# Patient Record
Sex: FEMALE | Race: WHITE | Employment: UNEMPLOYED | URBAN - METROPOLITAN AREA
[De-identification: names, ages, dates, MRNs, and addresses within clinical notes are randomized per-mention and may not be internally consistent; named-entity substitution may affect disease eponyms.]

---

## 2017-10-01 ENCOUNTER — APPOINTMENT (OUTPATIENT)
Dept: GENERAL RADIOLOGY | Age: 62
DRG: 193 | End: 2017-10-01
Attending: EMERGENCY MEDICINE
Payer: MEDICARE

## 2017-10-01 ENCOUNTER — HOSPITAL ENCOUNTER (INPATIENT)
Age: 62
LOS: 3 days | Discharge: HOME HEALTH | DRG: 193 | End: 2017-10-04
Attending: EMERGENCY MEDICINE | Admitting: HOSPITALIST
Payer: MEDICARE

## 2017-10-01 DIAGNOSIS — J44.1 ACUTE EXACERBATION OF CHRONIC OBSTRUCTIVE PULMONARY DISEASE (COPD) (HCC): ICD-10-CM

## 2017-10-01 DIAGNOSIS — I48.91 ATRIAL FIBRILLATION WITH RVR (HCC): Primary | ICD-10-CM

## 2017-10-01 PROBLEM — J90 PLEURAL EFFUSION: Status: ACTIVE | Noted: 2017-10-01

## 2017-10-01 LAB
ALBUMIN SERPL-MCNC: 3.3 G/DL (ref 3.4–5)
ALBUMIN/GLOB SERPL: 0.8 {RATIO} (ref 0.8–1.7)
ALP SERPL-CCNC: 170 U/L (ref 45–117)
ALT SERPL-CCNC: 14 U/L (ref 13–56)
ANION GAP SERPL CALC-SCNC: 6 MMOL/L (ref 3–18)
APPEARANCE UR: CLEAR
AST SERPL-CCNC: 14 U/L (ref 15–37)
BACTERIA URNS QL MICRO: ABNORMAL /HPF
BASOPHILS # BLD: 0 K/UL (ref 0–0.06)
BASOPHILS NFR BLD: 0 % (ref 0–2)
BILIRUB SERPL-MCNC: 1.1 MG/DL (ref 0.2–1)
BILIRUB UR QL: NEGATIVE
BNP SERPL-MCNC: 365 PG/ML (ref 0–900)
BUN SERPL-MCNC: 22 MG/DL (ref 7–18)
BUN/CREAT SERPL: 23 (ref 12–20)
CALCIUM SERPL-MCNC: 9 MG/DL (ref 8.5–10.1)
CHLORIDE SERPL-SCNC: 100 MMOL/L (ref 100–108)
CK MB CFR SERPL CALC: NORMAL % (ref 0–4)
CK MB SERPL-MCNC: <1 NG/ML (ref 5–25)
CK SERPL-CCNC: 31 U/L (ref 26–192)
CO2 SERPL-SCNC: 30 MMOL/L (ref 21–32)
COLOR UR: YELLOW
CREAT SERPL-MCNC: 0.94 MG/DL (ref 0.6–1.3)
DIFFERENTIAL METHOD BLD: ABNORMAL
EOSINOPHIL # BLD: 0.2 K/UL (ref 0–0.4)
EOSINOPHIL NFR BLD: 1 % (ref 0–5)
EPITH CASTS URNS QL MICRO: ABNORMAL /LPF (ref 0–5)
ERYTHROCYTE [DISTWIDTH] IN BLOOD BY AUTOMATED COUNT: 15 % (ref 11.6–14.5)
GLOBULIN SER CALC-MCNC: 3.9 G/DL (ref 2–4)
GLUCOSE SERPL-MCNC: 135 MG/DL (ref 74–99)
GLUCOSE UR STRIP.AUTO-MCNC: NEGATIVE MG/DL
HCT VFR BLD AUTO: 43.6 % (ref 35–45)
HGB BLD-MCNC: 13.6 G/DL (ref 12–16)
HGB UR QL STRIP: NEGATIVE
KETONES UR QL STRIP.AUTO: NEGATIVE MG/DL
LACTATE BLD-SCNC: 1.3 MMOL/L (ref 0.4–2)
LEUKOCYTE ESTERASE UR QL STRIP.AUTO: ABNORMAL
LYMPHOCYTES # BLD: 1.7 K/UL (ref 0.9–3.6)
LYMPHOCYTES NFR BLD: 15 % (ref 21–52)
MCH RBC QN AUTO: 30.3 PG (ref 24–34)
MCHC RBC AUTO-ENTMCNC: 31.2 G/DL (ref 31–37)
MCV RBC AUTO: 97.1 FL (ref 74–97)
MONOCYTES # BLD: 1.1 K/UL (ref 0.05–1.2)
MONOCYTES NFR BLD: 9 % (ref 3–10)
NEUTS SEG # BLD: 8.8 K/UL (ref 1.8–8)
NEUTS SEG NFR BLD: 75 % (ref 40–73)
NITRITE UR QL STRIP.AUTO: NEGATIVE
PH UR STRIP: 5.5 [PH] (ref 5–8)
PLATELET # BLD AUTO: 231 K/UL (ref 135–420)
PMV BLD AUTO: 10.1 FL (ref 9.2–11.8)
POTASSIUM SERPL-SCNC: 4.1 MMOL/L (ref 3.5–5.5)
PROT SERPL-MCNC: 7.2 G/DL (ref 6.4–8.2)
PROT UR STRIP-MCNC: NEGATIVE MG/DL
RBC # BLD AUTO: 4.49 M/UL (ref 4.2–5.3)
RBC #/AREA URNS HPF: ABNORMAL /HPF (ref 0–5)
SODIUM SERPL-SCNC: 136 MMOL/L (ref 136–145)
SP GR UR REFRACTOMETRY: 1.01 (ref 1–1.03)
T4 FREE SERPL-MCNC: 1.3 NG/DL (ref 0.7–1.5)
TROPONIN I SERPL-MCNC: <0.02 NG/ML (ref 0–0.04)
TSH SERPL DL<=0.05 MIU/L-ACNC: 0.62 UIU/ML (ref 0.36–3.74)
UROBILINOGEN UR QL STRIP.AUTO: 0.2 EU/DL (ref 0.2–1)
WBC # BLD AUTO: 11.7 K/UL (ref 4.6–13.2)
WBC URNS QL MICRO: ABNORMAL /HPF (ref 0–4)

## 2017-10-01 PROCEDURE — 74011000258 HC RX REV CODE- 258: Performed by: EMERGENCY MEDICINE

## 2017-10-01 PROCEDURE — 87077 CULTURE AEROBIC IDENTIFY: CPT | Performed by: EMERGENCY MEDICINE

## 2017-10-01 PROCEDURE — 99285 EMERGENCY DEPT VISIT HI MDM: CPT

## 2017-10-01 PROCEDURE — 74011000250 HC RX REV CODE- 250: Performed by: EMERGENCY MEDICINE

## 2017-10-01 PROCEDURE — 74011000258 HC RX REV CODE- 258: Performed by: HOSPITALIST

## 2017-10-01 PROCEDURE — 93005 ELECTROCARDIOGRAM TRACING: CPT

## 2017-10-01 PROCEDURE — 94640 AIRWAY INHALATION TREATMENT: CPT

## 2017-10-01 PROCEDURE — 74011250636 HC RX REV CODE- 250/636: Performed by: EMERGENCY MEDICINE

## 2017-10-01 PROCEDURE — 83880 ASSAY OF NATRIURETIC PEPTIDE: CPT | Performed by: EMERGENCY MEDICINE

## 2017-10-01 PROCEDURE — 83605 ASSAY OF LACTIC ACID: CPT

## 2017-10-01 PROCEDURE — 65660000001 HC RM ICU INTERMED STEPDOWN

## 2017-10-01 PROCEDURE — 81001 URINALYSIS AUTO W/SCOPE: CPT | Performed by: EMERGENCY MEDICINE

## 2017-10-01 PROCEDURE — 77030011256 HC DRSG MEPILEX <16IN NO BORD MOLN -A

## 2017-10-01 PROCEDURE — 51702 INSERT TEMP BLADDER CATH: CPT

## 2017-10-01 PROCEDURE — 87086 URINE CULTURE/COLONY COUNT: CPT | Performed by: EMERGENCY MEDICINE

## 2017-10-01 PROCEDURE — 74011000250 HC RX REV CODE- 250

## 2017-10-01 PROCEDURE — 74011250636 HC RX REV CODE- 250/636: Performed by: HOSPITALIST

## 2017-10-01 PROCEDURE — 80053 COMPREHEN METABOLIC PANEL: CPT | Performed by: EMERGENCY MEDICINE

## 2017-10-01 PROCEDURE — 74011250637 HC RX REV CODE- 250/637: Performed by: HOSPITALIST

## 2017-10-01 PROCEDURE — 77030034849

## 2017-10-01 PROCEDURE — 84439 ASSAY OF FREE THYROXINE: CPT | Performed by: HOSPITALIST

## 2017-10-01 PROCEDURE — 82550 ASSAY OF CK (CPK): CPT | Performed by: EMERGENCY MEDICINE

## 2017-10-01 PROCEDURE — 87186 SC STD MICRODIL/AGAR DIL: CPT | Performed by: EMERGENCY MEDICINE

## 2017-10-01 PROCEDURE — 87040 BLOOD CULTURE FOR BACTERIA: CPT | Performed by: EMERGENCY MEDICINE

## 2017-10-01 PROCEDURE — 96375 TX/PRO/DX INJ NEW DRUG ADDON: CPT

## 2017-10-01 PROCEDURE — 74011000250 HC RX REV CODE- 250: Performed by: HOSPITALIST

## 2017-10-01 PROCEDURE — 94760 N-INVAS EAR/PLS OXIMETRY 1: CPT

## 2017-10-01 PROCEDURE — 74011250637 HC RX REV CODE- 250/637: Performed by: EMERGENCY MEDICINE

## 2017-10-01 PROCEDURE — 71010 XR CHEST PORT: CPT

## 2017-10-01 PROCEDURE — 96365 THER/PROPH/DIAG IV INF INIT: CPT

## 2017-10-01 PROCEDURE — 84443 ASSAY THYROID STIM HORMONE: CPT | Performed by: EMERGENCY MEDICINE

## 2017-10-01 PROCEDURE — 85025 COMPLETE CBC W/AUTO DIFF WBC: CPT | Performed by: EMERGENCY MEDICINE

## 2017-10-01 PROCEDURE — 36415 COLL VENOUS BLD VENIPUNCTURE: CPT | Performed by: HOSPITALIST

## 2017-10-01 RX ORDER — AMITRIPTYLINE HYDROCHLORIDE 50 MG/1
50 TABLET, FILM COATED ORAL
Status: DISCONTINUED | OUTPATIENT
Start: 2017-10-01 | End: 2017-10-04 | Stop reason: HOSPADM

## 2017-10-01 RX ORDER — DILTIAZEM HYDROCHLORIDE 5 MG/ML
10 INJECTION INTRAVENOUS ONCE
Status: COMPLETED | OUTPATIENT
Start: 2017-10-01 | End: 2017-10-01

## 2017-10-01 RX ORDER — DIGOXIN 0.25 MG/ML
500 INJECTION INTRAMUSCULAR; INTRAVENOUS
Status: COMPLETED | OUTPATIENT
Start: 2017-10-01 | End: 2017-10-01

## 2017-10-01 RX ORDER — GUAIFENESIN 100 MG/5ML
324 LIQUID (ML) ORAL
Status: COMPLETED | OUTPATIENT
Start: 2017-10-01 | End: 2017-10-01

## 2017-10-01 RX ORDER — DILTIAZEM HYDROCHLORIDE 5 MG/ML
15 INJECTION INTRAVENOUS ONCE
Status: COMPLETED | OUTPATIENT
Start: 2017-10-01 | End: 2017-10-01

## 2017-10-01 RX ORDER — ALBUTEROL SULFATE 0.83 MG/ML
2.5 SOLUTION RESPIRATORY (INHALATION)
Status: DISCONTINUED | OUTPATIENT
Start: 2017-10-01 | End: 2017-10-01

## 2017-10-01 RX ORDER — BUMETANIDE 0.25 MG/ML
1 INJECTION INTRAMUSCULAR; INTRAVENOUS 2 TIMES DAILY
Status: CANCELLED | OUTPATIENT
Start: 2017-10-01

## 2017-10-01 RX ORDER — DICLOFENAC POTASSIUM 50 MG/1
50 TABLET, FILM COATED ORAL 2 TIMES DAILY
COMMUNITY
End: 2018-01-27

## 2017-10-01 RX ORDER — ONDANSETRON 2 MG/ML
4 INJECTION INTRAMUSCULAR; INTRAVENOUS
Status: DISCONTINUED | OUTPATIENT
Start: 2017-10-01 | End: 2017-10-04 | Stop reason: HOSPADM

## 2017-10-01 RX ORDER — IPRATROPIUM BROMIDE AND ALBUTEROL SULFATE 2.5; .5 MG/3ML; MG/3ML
3 SOLUTION RESPIRATORY (INHALATION)
Status: DISCONTINUED | OUTPATIENT
Start: 2017-10-01 | End: 2017-10-04 | Stop reason: HOSPADM

## 2017-10-01 RX ORDER — GLUCOSAMINE SULFATE 1500 MG
POWDER IN PACKET (EA) ORAL DAILY
COMMUNITY
End: 2018-01-27

## 2017-10-01 RX ORDER — HEPARIN SODIUM 5000 [USP'U]/ML
5000 INJECTION, SOLUTION INTRAVENOUS; SUBCUTANEOUS EVERY 8 HOURS
Status: DISCONTINUED | OUTPATIENT
Start: 2017-10-01 | End: 2017-10-04 | Stop reason: HOSPADM

## 2017-10-01 RX ORDER — HYDROCODONE BITARTRATE AND ACETAMINOPHEN 7.5; 325 MG/1; MG/1
1 TABLET ORAL EVERY 4 HOURS
Status: DISCONTINUED | OUTPATIENT
Start: 2017-10-01 | End: 2017-10-04 | Stop reason: HOSPADM

## 2017-10-01 RX ORDER — LEVOFLOXACIN 5 MG/ML
750 INJECTION, SOLUTION INTRAVENOUS EVERY 24 HOURS
Status: DISCONTINUED | OUTPATIENT
Start: 2017-10-02 | End: 2017-10-04

## 2017-10-01 RX ORDER — FUROSEMIDE 20 MG/1
TABLET ORAL DAILY
COMMUNITY
End: 2017-10-04

## 2017-10-01 RX ORDER — FUROSEMIDE 40 MG/1
40 TABLET ORAL DAILY
Status: DISCONTINUED | OUTPATIENT
Start: 2017-10-02 | End: 2017-10-04 | Stop reason: HOSPADM

## 2017-10-01 RX ORDER — ACETAMINOPHEN 325 MG/1
650 TABLET ORAL
Status: DISCONTINUED | OUTPATIENT
Start: 2017-10-01 | End: 2017-10-04 | Stop reason: HOSPADM

## 2017-10-01 RX ORDER — FUROSEMIDE 10 MG/ML
40 INJECTION INTRAMUSCULAR; INTRAVENOUS
Status: COMPLETED | OUTPATIENT
Start: 2017-10-01 | End: 2017-10-01

## 2017-10-01 RX ORDER — DILTIAZEM HYDROCHLORIDE 5 MG/ML
INJECTION INTRAVENOUS
Status: COMPLETED
Start: 2017-10-01 | End: 2017-10-01

## 2017-10-01 RX ORDER — ALBUTEROL SULFATE 0.83 MG/ML
2.5 SOLUTION RESPIRATORY (INHALATION)
Status: DISCONTINUED | OUTPATIENT
Start: 2017-10-01 | End: 2017-10-04 | Stop reason: HOSPADM

## 2017-10-01 RX ORDER — ALBUTEROL SULFATE 0.83 MG/ML
SOLUTION RESPIRATORY (INHALATION) ONCE
COMMUNITY
End: 2017-10-20 | Stop reason: SDUPTHER

## 2017-10-01 RX ORDER — HYDROCODONE BITARTRATE AND ACETAMINOPHEN 5; 325 MG/1; MG/1
1 TABLET ORAL
Status: COMPLETED | OUTPATIENT
Start: 2017-10-01 | End: 2017-10-01

## 2017-10-01 RX ORDER — BUMETANIDE 0.25 MG/ML
1 INJECTION INTRAMUSCULAR; INTRAVENOUS ONCE
Status: COMPLETED | OUTPATIENT
Start: 2017-10-01 | End: 2017-10-01

## 2017-10-01 RX ORDER — HYDROCODONE BITARTRATE AND ACETAMINOPHEN 7.5; 325 MG/1; MG/1
1 TABLET ORAL EVERY 4 HOURS
COMMUNITY

## 2017-10-01 RX ORDER — LEVOFLOXACIN 5 MG/ML
500 INJECTION, SOLUTION INTRAVENOUS
Status: COMPLETED | OUTPATIENT
Start: 2017-10-01 | End: 2017-10-01

## 2017-10-01 RX ORDER — AMITRIPTYLINE HYDROCHLORIDE 25 MG/1
50 TABLET, FILM COATED ORAL
COMMUNITY
End: 2018-02-12

## 2017-10-01 RX ADMIN — DIGOXIN 500 MCG: 250 INJECTION, SOLUTION INTRAMUSCULAR; INTRAVENOUS; PARENTERAL at 11:16

## 2017-10-01 RX ADMIN — AMITRIPTYLINE HYDROCHLORIDE 50 MG: 50 TABLET, FILM COATED ORAL at 22:27

## 2017-10-01 RX ADMIN — DILTIAZEM HYDROCHLORIDE 15 MG: 5 INJECTION INTRAVENOUS at 10:36

## 2017-10-01 RX ADMIN — LEVOFLOXACIN 500 MG: 500 INJECTION, SOLUTION INTRAVENOUS at 13:43

## 2017-10-01 RX ADMIN — HEPARIN SODIUM 5000 UNITS: 5000 INJECTION, SOLUTION INTRAVENOUS; SUBCUTANEOUS at 16:43

## 2017-10-01 RX ADMIN — NITROGLYCERIN 0.5 INCH: 20 OINTMENT TOPICAL at 11:03

## 2017-10-01 RX ADMIN — FUROSEMIDE 40 MG: 10 INJECTION, SOLUTION INTRAMUSCULAR; INTRAVENOUS at 11:02

## 2017-10-01 RX ADMIN — HYDROCODONE BITARTRATE AND ACETAMINOPHEN 1 TABLET: 7.5; 325 TABLET ORAL at 16:43

## 2017-10-01 RX ADMIN — ASPIRIN 81 MG 324 MG: 81 TABLET ORAL at 11:02

## 2017-10-01 RX ADMIN — IPRATROPIUM BROMIDE AND ALBUTEROL SULFATE 3 ML: .5; 3 SOLUTION RESPIRATORY (INHALATION) at 19:45

## 2017-10-01 RX ADMIN — BUMETANIDE 1 MG: 0.25 INJECTION, SOLUTION INTRAMUSCULAR; INTRAVENOUS at 13:44

## 2017-10-01 RX ADMIN — IPRATROPIUM BROMIDE AND ALBUTEROL SULFATE 3 ML: .5; 3 SOLUTION RESPIRATORY (INHALATION) at 16:15

## 2017-10-01 RX ADMIN — DILTIAZEM HYDROCHLORIDE 10 MG: 5 INJECTION INTRAVENOUS at 11:02

## 2017-10-01 RX ADMIN — SODIUM CHLORIDE 10 MG/HR: 900 INJECTION, SOLUTION INTRAVENOUS at 11:04

## 2017-10-01 RX ADMIN — HYDROCODONE BITARTRATE AND ACETAMINOPHEN 1 TABLET: 7.5; 325 TABLET ORAL at 20:44

## 2017-10-01 RX ADMIN — HYDROCODONE BITARTRATE AND ACETAMINOPHEN 1 TABLET: 5; 325 TABLET ORAL at 12:25

## 2017-10-01 NOTE — IP AVS SNAPSHOT
Candice Lebron 
 
 
 [address] [place] [address] 
[phone] Patient: Luna Yang MRN: [id number] Current Discharge Medication List  
  
START taking these medications Dose & Instructions Dispensing Information Comments Morning Noon Evening Bedtime  
 albuterol-ipratropium 2.5 mg-0.5 mg/3 ml Nebu Commonly known as:  Everrett Margaret Dose:  3 mL  
3 mL by Nebulization route every four (4) hours as needed. Quantity:  30 Nebule Refills:  0  
     
   
   
   
  
 aspirin 325 mg tablet Commonly known as:  ASPIRIN Dose:  325 mg Take 1 Tab by mouth daily. Quantity:  100 Tab Refills:  0  
     
  
   
   
   
  
 dexamethasone 2 mg tablet Commonly known as:  DECADRON Take 4 mg po tid x 3 days,then 4 mg po bid x 3 days,then 4 mg po daily x 3 days Quantity:  18 Tab Refills:  0  
     
  
   
   
   
  
 digoxin 0.25 mg tablet Commonly known as:  LANOXIN Dose:  0.25 mg Take 1 Tab by mouth daily. Quantity:  30 Tab Refills:  0  
     
  
   
   
   
  
 levoFLOXacin 750 mg tablet Commonly known as:  Arlyss Palmer Dose:  750 mg Take 1 Tab by mouth daily. Quantity:  7 Tab Refills:  0  
     
  
   
   
   
  
 metoprolol tartrate 25 mg tablet Commonly known as:  LOPRESSOR Dose:  12.5 mg Take 0.5 Tabs by mouth every twelve (12) hours. Quantity:  60 Tab Refills:  0 CONTINUE these medications which have CHANGED Dose & Instructions Dispensing Information Comments Morning Noon Evening Bedtime  
 furosemide 40 mg tablet Commonly known as:  LASIX What changed:   
- medication strength 
- how to take this - when to take this 
- additional instructions 40 MG PO DAILY Quantity:  30 Tab Refills:  0 CONTINUE these medications which have NOT CHANGED Dose & Instructions Dispensing Information Comments Morning Noon Evening Bedtime  
 albuterol 2.5 mg /3 mL (0.083 %) nebulizer solution Commonly known as:  PROVENTIL VENTOLIN  
   
 by Nebulization route once. Refills:  0  
     
   
   
   
  
 amitriptyline 25 mg tablet Commonly known as:  ELAVIL Dose:  50 mg Take 50 mg by mouth nightly. Refills:  0  
     
   
   
   
  
 diclofenac potassium 50 mg tablet Commonly known as:  CATAFLAM  
   
 Dose:  50 mg Take 50 mg by mouth two (2) times a day. Indications: RHEUMATOID ARTHRITIS Refills:  0 NORCO 7.5-325 mg per tablet Generic drug:  HYDROcodone-acetaminophen Dose:  1 Tab Take 1 Tab by mouth every four (4) hours. Refills:  0  
     
   
   
   
  
 VITAMIN D3 1,000 unit Cap Generic drug:  cholecalciferol Take  by mouth daily. Refills:  0 Where to Get Your Medications Information on where to get these meds will be given to you by the nurse or doctor. ! Ask your nurse or doctor about these medications  
  albuterol-ipratropium 2.5 mg-0.5 mg/3 ml Nebu  
 aspirin 325 mg tablet  
 dexamethasone 2 mg tablet  
 digoxin 0.25 mg tablet  
 furosemide 40 mg tablet  
 levoFLOXacin 750 mg tablet  
 metoprolol tartrate 25 mg tablet

## 2017-10-01 NOTE — IP AVS SNAPSHOT
Lennie Mckeon 
 
 
 [address] [place] [address] 
[phone] Patient: Melquiades Palmer MRN: [id number] TSN:0/7/9464 You are allergic to the following Allergen Reactions Peanut Anaphylaxis Sting, Bee Anaphylaxis Biaxin (Clarithromycin) Angioedema Penicillins Angioedema Prednisone Angioedema Recent Documentation Height Weight BMI Smoking Status 1.626 m 136.8 kg 51.75 kg/m2 Passive Smoke Exposure - Never Smoker Unresulted Labs Order Current Status CULTURE, BLOOD Preliminary result CULTURE, BLOOD Preliminary result Emergency Contacts Name Discharge Info Relation Home Work Mobile Nato Monroy DISCHARGE CAREGIVER [3] Friend [5]   [phone] About your hospitalization You were admitted on:  October 1, 2017 You last received care in the:  [address] [place], [place] [address] You were discharged on:  October 4, 2017 Unit phone number:  [phone] Why you were hospitalized Your primary diagnosis was:  Atrial Fibrillation With Rvr (Hcc) Your diagnoses also included:  Copd Exacerbation (Hcc), Atrial Fibrillation (Hcc) Providers Seen During Your Hospitalizations Provider Role Specialty Primary office phone Bairon Vanegas MD Attending Provider Emergency Medicine [phone] Katelin Chang MD Attending Provider Internal Medicine [phone] Marbella Calloway MD Attending Provider Internal Medicine [phone] Your Primary Care Physician (PCP) Primary Care Physician Office Phone Office Fax Nicole Sun [phone] Follow-up Information Follow up With Details Comments Contact Info Mauri Fletcher MD On 10/9/2017 1:30pm [address] [facility] [address] 
[phone] Brigido Fish MD Call in 4 weeks Schedule appt [address] [facility] [address] [facility] [address] [phone] 
[phone] Current Discharge Medication List  
  
START taking these medications Dose & Instructions Dispensing Information Comments Morning Noon Evening Bedtime  
 albuterol-ipratropium 2.5 mg-0.5 mg/3 ml Nebu Commonly known as:  Dinesh Franklin Dose:  3 mL  
3 mL by Nebulization route every four (4) hours as needed. Quantity:  30 Nebule Refills:  0  
     
   
   
   
  
 aspirin 325 mg tablet Commonly known as:  ASPIRIN Dose:  325 mg Take 1 Tab by mouth daily. Quantity:  100 Tab Refills:  0  
     
  
   
   
   
  
 dexamethasone 2 mg tablet Commonly known as:  DECADRON Take 4 mg po tid x 3 days,then 4 mg po bid x 3 days,then 4 mg po daily x 3 days Quantity:  18 Tab Refills:  0  
     
  
   
   
   
  
 digoxin 0.25 mg tablet Commonly known as:  LANOXIN Dose:  0.25 mg Take 1 Tab by mouth daily. Quantity:  30 Tab Refills:  0  
     
  
   
   
   
  
 levoFLOXacin 750 mg tablet Commonly known as:  Rue Renny Dose:  750 mg Take 1 Tab by mouth daily. Quantity:  7 Tab Refills:  0  
     
  
   
   
   
  
 metoprolol tartrate 25 mg tablet Commonly known as:  LOPRESSOR Dose:  12.5 mg Take 0.5 Tabs by mouth every twelve (12) hours. Quantity:  60 Tab Refills:  0 CONTINUE these medications which have CHANGED Dose & Instructions Dispensing Information Comments Morning Noon Evening Bedtime  
 furosemide 40 mg tablet Commonly known as:  LASIX What changed:   
- medication strength 
- how to take this - when to take this 
- additional instructions 40 MG PO DAILY Quantity:  30 Tab Refills:  0 CONTINUE these medications which have NOT CHANGED Dose & Instructions Dispensing Information Comments Morning Noon Evening Bedtime  
 albuterol 2.5 mg /3 mL (0.083 %) nebulizer solution Commonly known as:  PROVENTIL VENTOLIN  
   
 by Nebulization route once. Refills:  0  
     
   
   
   
  
 amitriptyline 25 mg tablet Commonly known as:  ELAVIL Dose:  50 mg Take 50 mg by mouth nightly. Refills:  0  
     
   
   
   
  
 diclofenac potassium 50 mg tablet Commonly known as:  CATAFLAM  
   
 Dose:  50 mg Take 50 mg by mouth two (2) times a day. Indications: RHEUMATOID ARTHRITIS Refills:  0 NORCO 7.5-325 mg per tablet Generic drug:  HYDROcodone-acetaminophen Dose:  1 Tab Take 1 Tab by mouth every four (4) hours. Refills:  0  
     
   
   
   
  
 VITAMIN D3 1,000 unit Cap Generic drug:  cholecalciferol Take  by mouth daily. Refills:  0 Where to Get Your Medications Information on where to get these meds will be given to you by the nurse or doctor. ! Ask your nurse or doctor about these medications  
  albuterol-ipratropium 2.5 mg-0.5 mg/3 ml Nebu  
 aspirin 325 mg tablet  
 dexamethasone 2 mg tablet  
 digoxin 0.25 mg tablet  
 furosemide 40 mg tablet  
 levoFLOXacin 750 mg tablet  
 metoprolol tartrate 25 mg tablet Discharge Instructions Atrial Fibrillation: Care Instructions Your Care Instructions Atrial fibrillation is an irregular and often fast heartbeat. Treating this condition is important for several reasons. It can cause blood clots, which can travel from your heart to your brain and cause a stroke. If you have a fast heartbeat, you may feel lightheaded, dizzy, and weak. An irregular heartbeat can also increase your risk for heart failure. Atrial fibrillation is often the result of another heart condition, such as high blood pressure or coronary artery disease. Making changes to improve your heart condition will help you stay healthy and active. Follow-up care is a key part of your treatment and safety.  Be sure to make and go to all appointments, and call your doctor if you are having problems. It's also a good idea to know your test results and keep a list of the medicines you take. How can you care for yourself at home? Medicines · Take your medicines exactly as prescribed. Call your doctor if you think you are having a problem with your medicine. You will get more details on the specific medicines your doctor prescribes. · If your doctor has given you a blood thinner to prevent a stroke, be sure you get instructions about how to take your medicine safely. Blood thinners can cause serious bleeding problems. · Do not take any vitamins, over-the-counter drugs, or herbal products without talking to your doctor first. 
Lifestyle changes · Do not smoke. Smoking can increase your chance of a stroke and heart attack. If you need help quitting, talk to your doctor about stop-smoking programs and medicines. These can increase your chances of quitting for good. · Eat a heart-healthy diet. · Stay at a healthy weight. Lose weight if you need to. · Limit alcohol to 2 drinks a day for men and 1 drink a day for women. Too much alcohol can cause health problems. · Avoid colds and flu. Get a pneumococcal vaccine shot. If you have had one before, ask your doctor whether you need another dose. Get a flu shot every year. If you must be around people with colds or flu, wash your hands often. Activity · If your doctor recommends it, get more exercise. Walking is a good choice. Bit by bit, increase the amount you walk every day. Try for at least 30 minutes on most days of the week. You also may want to swim, bike, or do other activities. Your doctor may suggest that you join a cardiac rehabilitation program so that you can have help increasing your physical activity safely. · Start light exercise if your doctor says it is okay. Even a small amount will help you get stronger, have more energy, and manage stress.  Walking is an easy way to get exercise. Start out by walking a little more than you did in the hospital. Gradually increase the amount you walk. · When you exercise, watch for signs that your heart is working too hard. You are pushing too hard if you cannot talk while you are exercising. If you become short of breath or dizzy or have chest pain, sit down and rest immediately. · Check your pulse regularly. Place two fingers on the artery at the palm side of your wrist, in line with your thumb. If your heartbeat seems uneven or fast, talk to your doctor. When should you call for help? Call 911 anytime you think you may need emergency care. For example, call if: 
· You have symptoms of a heart attack. These may include: ¨ Chest pain or pressure, or a strange feeling in the chest. 
¨ Sweating. ¨ Shortness of breath. ¨ Nausea or vomiting. ¨ Pain, pressure, or a strange feeling in the back, neck, jaw, or upper belly or in one or both shoulders or arms. ¨ Lightheadedness or sudden weakness. ¨ A fast or irregular heartbeat. After you call 911, the  may tell you to chew 1 adult-strength or 2 to 4 low-dose aspirin. Wait for an ambulance. Do not try to drive yourself. · You have symptoms of a stroke. These may include: 
¨ Sudden numbness, tingling, weakness, or loss of movement in your face, arm, or leg, especially on only one side of your body. ¨ Sudden vision changes. ¨ Sudden trouble speaking. ¨ Sudden confusion or trouble understanding simple statements. ¨ Sudden problems with walking or balance. ¨ A sudden, severe headache that is different from past headaches. · You passed out (lost consciousness). Call your doctor now or seek immediate medical care if: 
· You have new or increased shortness of breath. · You feel dizzy or lightheaded, or you feel like you may faint. · Your heart rate becomes irregular. · You can feel your heart flutter in your chest or skip heartbeats.  Tell your doctor if these symptoms are new or worse. Watch closely for changes in your health, and be sure to contact your doctor if you have any problems. Where can you learn more? Go to http://driss-nabila.info/. Enter U020 in the search box to learn more about \"Atrial Fibrillation: Care Instructions. \" Current as of: September 21, 2016 Content Version: 11.3 © 0977-2675 [employer]. Care instructions adapted under license by [employer] (which disclaims liability or warranty for this information). If you have questions about a medical condition or this instruction, always ask your healthcare professional. [place] [address] any warranty or liability for your use of this information. Deciding Between Electrical Cardioversion and Rate Control Medicines for Atrial Fibrillation What is atrial fibrillation? Atrial fibrillation (say \"AY-tree-sumi kiy-bekf-SDG-shun\") is a kind of uneven heartbeat. It can make you feel lightheaded and dizzy. You may feel weak. It also can make you more likely to have a stroke. Electrical cardioversion can return your heart to a normal rhythm. First you'll get medicines to make you sleepy and control pain. Then your doctor will use patches to send an electric current to your heart. This resets the rhythm of your heart. Not everyone with atrial fibrillation needs this treatment. For some people, taking medicines may be better. Most people can live with an uneven heartbeat. It just has to be kept under control so the heart does not beat too fast. 
Use this information to help you and your doctor decide which treatment to choose for atrial fibrillation. What are alvarenga points about this decision? · Electrical cardioversion can return your heart to a normal rhythm. But the problem can come back. The longer you have had atrial fibrillation, the more likely it is to come back after this treatment. · Cardioversion may not work as well when an uneven heartbeat is caused by another heart disease, such as heart failure. · If your symptoms bother you a lot, you may want to try cardioversion. But even if it works, you may still need to take blood thinners to prevent a stroke. · If you don't have symptoms, or if they don't bother you much, you can try medicines to slow your heart rate. And you can take blood thinners to prevent a stroke. · Cardioversion does have risks, such as stroke. Discuss the risks with your doctor. Make sure you understand them. · You may have more than one heart problem. Cardioversion doesn't work as well if you have more than one heart problem. Why might you choose electrical cardioversion? · It restores the normal heart rhythm for most people. · The idea of having an electric shock does not bother you. · Your symptoms bother you a lot. · You have had atrial fibrillation just one time. · You do not have other heart problems. · You may not have to take as many medicines. Or you may not need to take them as long. Why might you choose rate-control medicines? · These medicines keep many people from having symptoms. · You prefer to take medicines rather than have an electric shock. · Your symptoms don't bother you much. · If these medicines don't work, you can still try electrical cardioversion. Your decision Thinking about the facts and your feelings can help you make a decision that is right for you. Be sure you understand the benefits and risks of your options. And think about what else you need to do before you make the decision. Where can you learn more? Go to http://driss-nabila.info/. Enter T432 in the search box to learn more about \"Deciding Between Electrical Cardioversion and Rate Control Medicines for Atrial Fibrillation. \" Current as of: April 3, 2017 Content Version: 11.3 © 8579-1770 [employer].  Care instructions adapted under license by [address] (which disclaims liability or warranty for this information). If you have questions about a medical condition or this instruction, always ask your healthcare professional. [address] any warranty or liability for your use of this information. Learning About Saving Energy When You Have a Chronic Condition Introduction Everyday tasks can be tiring when you have COPD, heart failure, or another long-term (chronic) condition. You may feel at times that you've lost your ability to live your life. But learning to conserve, or save, your energy can help you be less tired. Conserving your energy means finding ways of doing daily activities with as little effort as possible. With some small changes in the way you do things, you can get your tasks done more easily. Some treatments are available that might help. Pulmonary rehabilitation can teach you ways to breathe easier. Cardiac rehabilitation can help make your heart stronger. You also may want to see an occupational or physical therapist. The therapist can give you more tips on building strength and moving with less effort. What can you do to conserve your energy? Planning · Make a list of what you have to do every day. Group the tasks by location. · Do all the chores in one part of your house around the same time. · Go out for errands or do chores at the time of day when you have the most energy. · Plan rest periods into your day. Getting things done · Sit down as often as you can when you get dressed, do chores, or cook. · Use a cart with wheels to roll items, such as laundry, from one room to another. · Push or slide boxes or other large items instead of lifting them. Reaching and bending · Put things you use the most on shelves that are at the level of your waist or shoulder.  
· Use long-handled grabbers or other tools to reach items on a high shelf or to  things off the floor. Use long-handled dusters when you clean the house. · Use a raised toilet seat to avoid bending too far to sit or stand up. Eating · Eat several small meals instead of three larger meals. · If you get too tired to eat much, try to choose healthy foods that have more calories. Have a yogurt-and-fruit smoothie for breakfast. Put avocado on a sandwich. Or add cheese or peanut butter to snacks. · If you don't feel very hungry, try to eat first and drink water or other fluids later, after a meal. This can help keep you from losing weight. Sip small amounts of fluids if you need to drink while you eat. Having sex · Choose the time of day when you have more energy. · A svoj-hn-ykep position for sex can be less tiring. Sometimes you may want to focus more on caressing. Watch closely for changes in your health, and be sure to contact your doctor if you have any problems. Where can you learn more? Go to http://driss-nabila.info/. Enter H190 in the search box to learn more about \"Learning About Saving Energy When You Have a Chronic Condition. \" Current as of: March 25, 2017 Content Version: 11.3 © 2852-3417 [employer]. Care instructions adapted under license by [employer] (which disclaims liability or warranty for this information). If you have questions about a medical condition or this instruction, always ask your healthcare professional. [place] [address] any warranty or liability for your use of this information. Chronic Obstructive Pulmonary Disease (COPD): Care Instructions Your Care Instructions Chronic obstructive pulmonary disease (COPD) is a general term for a group of lung diseases, including emphysema and chronic bronchitis. People with COPD have decreased airflow in and out of the lungs, which makes it hard to breathe. The airways also can get clogged with thick mucus.  Cigarette smoking is a major cause of COPD. Although there is no cure for COPD, you can slow its progress. Following your treatment plan and taking care of yourself can help you feel better and live longer. Follow-up care is a key part of your treatment and safety. Be sure to make and go to all appointments, and call your doctor if you are having problems. It's also a good idea to know your test results and keep a list of the medicines you take. How can you care for yourself at home? Staying healthy · Do not smoke. This is the most important step you can take to prevent more damage to your lungs. If you need help quitting, talk to your doctor about stop-smoking programs and medicines. These can increase your chances of quitting for good. · Avoid colds and flu. Get a pneumococcal vaccine shot. If you have had one before, ask your doctor whether you need a second dose. Get the flu vaccine every fall. If you must be around people with colds or the flu, wash your hands often. · Avoid secondhand smoke, air pollution, and high altitudes. Also avoid cold, dry air and hot, humid air. Stay at home with your windows closed when air pollution is bad. Medicines and oxygen therapy · Take your medicines exactly as prescribed. Call your doctor if you think you are having a problem with your medicine. · You may be taking medicines such as: ¨ Bronchodilators. These help open your airways and make breathing easier. Bronchodilators are either short-acting (work for 6 to 9 hours) or long-acting (work for 24 hours). You inhale most bronchodilators, so they start to act quickly. Always carry your quick-relief inhaler with you in case you need it while you are away from home. ¨ Corticosteroids (prednisone, budesonide). These reduce airway inflammation. They come in pill or inhaled form. You must take these medicines every day for them to work well. · A spacer may help you get more inhaled medicine to your lungs.  Ask your doctor or pharmacist if a spacer is right for you. If it is, ask how to use it properly. · Do not take any vitamins, over-the-counter medicine, or herbal products without talking to your doctor first. 
· If your doctor prescribed antibiotics, take them as directed. Do not stop taking them just because you feel better. You need to take the full course of antibiotics. · Oxygen therapy boosts the amount of oxygen in your blood and helps you breathe easier. Use the flow rate your doctor has recommended, and do not change it without talking to your doctor first. 
Activity · Get regular exercise. Walking is an easy way to get exercise. Start out slowly, and walk a little more each day. · Pay attention to your breathing. You are exercising too hard if you cannot talk while you are exercising. · Take short rest breaks when doing household chores and other activities. · Learn breathing methodssuch as breathing through pursed lipsto help you become less short of breath. · If your doctor has not set you up with a pulmonary rehabilitation program, talk to him or her about whether rehab is right for you. Rehab includes exercise programs, education about your disease and how to manage it, help with diet and other changes, and emotional support. Diet · Eat regular, healthy meals. Use bronchodilators about 1 hour before you eat to make it easier to eat. Eat several small meals instead of three large ones. Drink beverages at the end of the meal. Avoid foods that are hard to chew. · Eat foods that contain protein so that you do not lose muscle mass. · Talk with your doctor if you gain too much weight or if you lose weight without trying. Mental health · Talk to your family, friends, or a therapist about your feelings. It is normal to feel frightened, angry, hopeless, helpless, and even guilty. Talking openly about bad feelings can help you cope. If these feelings last, talk to your doctor. When should you call for help? Call 911 anytime you think you may need emergency care. For example, call if: 
· You have severe trouble breathing. Call your doctor now or seek immediate medical care if: 
· You have new or worse trouble breathing. · You cough up blood. · You have a fever. Watch closely for changes in your health, and be sure to contact your doctor if: 
· You cough more deeply or more often, especially if you notice more mucus or a change in the color of your mucus. · You have new or worse swelling in your legs or belly. · You are not getting better as expected. Where can you learn more? Go to http://driss-nabila.info/. Selma Crowell in the search box to learn more about \"Chronic Obstructive Pulmonary Disease (COPD): Care Instructions. \" Current as of: March 25, 2017 Content Version: 11.3 © 8700-2873 "[employer] Care instructions adapted under license by [employer] (which disclaims liability or warranty for this information). If you have questions about a medical condition or this instruction, always ask your healthcare professional. [address] any warranty or liability for your use of this information. Discharge Orders None [employer] Announcement We are excited to announce that we are making your provider's discharge notes available to you in [employer] You will see these notes when they are completed and signed by the physician that discharged you from your recent hospital stay. If you have any questions or concerns about any information you see in [employer] please call the Health Information Department where you were seen or reach out to your Primary Care Provider for more information about your plan of care. Introducing [facility]! Rachael Lutz introduces [employer] patient portal. Now you can access parts of your medical record, email your doctor's office, and request medication refills online. 1. In your internet browser, go to [email or web address] 2. Click on the First Time User? Click Here link in the Sign In box. You will see the New Member Sign Up page. 3. Enter your [employer] Access Code exactly as it appears below. You will not need to use this code after youve completed the sign-up process. If you do not sign up before the expiration date, you must request a new code. · [employer] Access Code: [email or web address] Expires: 1/2/2018  4:57 PM 
 
4. Enter the last four digits of your Social Security Number (xxxx) and Date of Birth (mm/dd/yyyy) as indicated and click Submit. You will be taken to the next sign-up page. 5. Create a [employer] ID. This will be your [employer] login ID and cannot be changed, so think of one that is secure and easy to remember. 6. Create a [employer] password. You can change your password at any time. 7. Enter your Password Reset Question and Answer. This can be used at a later time if you forget your password. 8. Enter your e-mail address. You will receive e-mail notification when new information is available in [address] 9. Click Sign Up. You can now view and download portions of your medical record. 10. Click the Download Summary menu link to download a portable copy of your medical information. If you have questions, please visit the Frequently Asked Questions section of the [employer] website. Remember, [employer] is NOT to be used for urgent needs. For medical emergencies, dial 911. Now available from your iPhone and Android! General Information Please provide this summary of care documentation to your next provider. Patient Signature:  ____________________________________________________________ Date:  ____________________________________________________________  
  
Aloma Snellen Provider Signature:  ____________________________________________________________ Date:  ____________________________________________________________

## 2017-10-01 NOTE — ED TRIAGE NOTES
Patient has been experiencing SOB for a few days that has been getting worst. Pt has asthma and COPD, stated she has not smoked in a month, but patient exposed to second hand smoke at home.

## 2017-10-01 NOTE — ED PROVIDER NOTES
HPI Comments: 10:28 AM        Tamia Hay is a 65 y.o. Female with PMHx of COPD presents to the ED with a chief complaint of SOB since 2 days. EMS states no rales and normal vitals on arrival to  pt. Pt states she had SOB for 2 days that got worse this morning. Pt also states chest pain and says she can't lay flat for the past 2 years. Pt reports being allergic to prednisone, penicillin and biaxin. Patient denies fever, chills, N/V/D, dysuria, diaphoresis, syncope or any other symptoms or complaints. The history is provided by the EMS personnel and the nursing home. No  was used. Past Medical History:   Diagnosis Date    Arthritis     Asthma     Chronic obstructive pulmonary disease ([facility] [address]        History reviewed. No pertinent surgical history. History reviewed. No pertinent family history. Social History     Social History    Marital status:      Spouse name: N/A    Number of children: N/A    Years of education: N/A     Occupational History    Not on file. Social History Main Topics    Smoking status: Passive Smoke Exposure - Never Smoker    Smokeless tobacco: Never Used    Alcohol use No    Drug use: No    Sexual activity: Not on file     Other Topics Concern    Not on file     Social History Narrative    No narrative on file         ALLERGIES: Peanut; Sting, bee; Biaxin [clarithromycin]; Penicillins; and Prednisone    Review of Systems   Constitutional: Negative for activity change, fatigue and fever. HENT: Negative for congestion and rhinorrhea. Eyes: Negative for visual disturbance. Respiratory: Positive for cough and shortness of breath. Cardiovascular: Positive for chest pain. Negative for palpitations. Gastrointestinal: Negative for abdominal pain, diarrhea, nausea and vomiting. Endocrine: Negative for polyuria. Genitourinary: Negative for dysuria and hematuria. Musculoskeletal: Negative for back pain.    Skin: Negative for rash. Neurological: Negative for dizziness, weakness and light-headedness. Psychiatric/Behavioral: Negative for confusion. Vitals:    10/01/17 1036 10/01/17 1049 10/01/17 1116 10/01/17 1153   BP:  112/65 116/67    Pulse: (!) 175 (!) 123 (!) 138    Resp:  25     Temp:    97.9 °F (36.6 °C)   SpO2:  94%     Weight:  135.2 kg (298 lb)     Height:  5' 4\" (1.626 m)              Physical Exam   Constitutional: She is oriented to person, place, and time. She appears well-developed and well-nourished. Able to speak full sentences. HENT:   Head: Normocephalic and atraumatic. Mouth/Throat: Oropharynx is clear and moist.   Eyes: Conjunctivae and EOM are normal. Pupils are equal, round, and reactive to light. No scleral icterus. Neck: Normal range of motion. Neck supple. Cardiovascular: Normal heart sounds. An irregularly irregular rhythm present. Tachycardia present. No murmur heard. Pulmonary/Chest: She is in respiratory distress (mild). She has wheezes (minimal expiratory). Abdominal: Soft. Bowel sounds are normal. She exhibits no distension. There is no tenderness. Musculoskeletal: She exhibits no edema. Lymphadenopathy:     She has no cervical adenopathy. Neurological: She is alert and oriented to person, place, and time. Coordination normal.   Skin: Skin is warm and dry. No rash noted. Psychiatric: She has a normal mood and affect. Her behavior is normal.   Nursing note and vitals reviewed. MDM  Number of Diagnoses or Management Options  Acute exacerbation of chronic obstructive pulmonary disease (COPD) ([facility] [address]):   Atrial fibrillation with RVR [facility]):   Diagnosis management comments: H/o asthma copd with increased dyspnea today however increased orthopnea and lower ext edema X months given hhn X 3 per ems with mag no wheeze at present however mild distress ? Rales with a fib RVR and hypoxia .  Per pt allergic to solumedrol    Ekg: A fib RVR initially 143 R BBB    Lasix, nitro, asa given in ED continued hhn diltiazem bolus and gtts    Continued tachycardia will add digoxin and increased diltiazem bolus        Amount and/or Complexity of Data Reviewed  Clinical lab tests: ordered and reviewed  Tests in the radiology section of CPT®: ordered and reviewed  Independent visualization of images, tracings, or specimens: yes    Risk of Complications, Morbidity, and/or Mortality  Presenting problems: high  Diagnostic procedures: moderate  Management options: moderate    Critical Care  Total time providing critical care: 30-74 minutes (40 min critical care for rhythm and oxygenation )    Patient Progress  Patient progress: improved    ED Course       Procedures           Vitals:  Patient Vitals for the past 12 hrs:   Temp Pulse Resp BP SpO2   10/01/17 1153 97.9 °F (36.6 °C) - - - -   10/01/17 1116 - (!) 138 - 116/67 -   10/01/17 1049 - (!) 123 25 112/65 94 %   10/01/17 1036 - (!) 175 - - -       Medications Ordered:  Medications   levoFLOXacin (LEVAQUIN) 500 mg in D5W IVPB (not administered)   bumetanide (BUMEX) injection 1 mg (not administered)   dilTIAZem (CARDIZEM) injection 15 mg (15 mg IntraVENous Given 10/1/17 1036)   dilTIAZem (CARDIZEM) 100 mg in 0.9% sodium chloride (MBP/ADV) 100 mL infusion (10 mg/hr IntraVENous New Bag 10/1/17 1104)   dilTIAZem (CARDIZEM) injection 10 mg (10 mg IntraVENous Given 10/1/17 1102)   furosemide (LASIX) injection 40 mg (40 mg IntraVENous Given 10/1/17 1102)   nitroglycerin (NITROBID) 2 % ointment 0.5 Inch (0.5 Inches Topical Given 10/1/17 1103)   aspirin chewable tablet 324 mg (324 mg Oral Given 10/1/17 1102)   digoxin (LANOXIN) injection 500 mcg (500 mcg IntraVENous Given 10/1/17 1116)   HYDROcodone-acetaminophen (NORCO) 5-325 mg per tablet 1 Tab (1 Tab Oral Given 10/1/17 1225)       Lab Findings:  Recent Results (from the past 12 hour(s))   EKG, 12 LEAD, INITIAL    Collection Time: 10/01/17 10:39 AM   Result Value Ref Range    Ventricular Rate 143 BPM Atrial Rate 118 BPM    P-R Interval 148 ms    QRS Duration 52 ms    Q-T Interval 274 ms    QTC Calculation (Bezet) 422 ms    Calculated R Axis -149 degrees    Calculated T Axis -16 degrees    Diagnosis       Sinus tachycardia with frequent and consecutive premature ventricular   complexes and fusion complexes  Right ventricular hypertrophy with repolarization abnormality  Inferior infarct , age undetermined  Anterolateral infarct , possibly acute  Marked ST abnormality, possible septal subendocardial injury  ACUTE MI  Abnormal ECG  No previous ECGs available     CBC WITH AUTOMATED DIFF    Collection Time: 10/01/17 10:45 AM   Result Value Ref Range    WBC 11.7 4.6 - 13.2 K/uL    RBC 4.49 4.20 - 5.30 M/uL    HGB 13.6 12.0 - 16.0 g/dL    HCT 43.6 35.0 - 45.0 %    MCV 97.1 (H) 74.0 - 97.0 FL    MCH 30.3 24.0 - 34.0 PG    MCHC 31.2 31.0 - 37.0 g/dL    RDW 15.0 (H) 11.6 - 14.5 %    PLATELET 039 542 - 050 K/uL    MPV 10.1 9.2 - 11.8 FL    NEUTROPHILS 75 (H) 40 - 73 %    LYMPHOCYTES 15 (L) 21 - 52 %    MONOCYTES 9 3 - 10 %    EOSINOPHILS 1 0 - 5 %    BASOPHILS 0 0 - 2 %    ABS. NEUTROPHILS 8.8 (H) 1.8 - 8.0 K/UL    ABS. LYMPHOCYTES 1.7 0.9 - 3.6 K/UL    ABS. MONOCYTES 1.1 0.05 - 1.2 K/UL    ABS. EOSINOPHILS 0.2 0.0 - 0.4 K/UL    ABS. BASOPHILS 0.0 0.0 - 0.06 K/UL    DF AUTOMATED     METABOLIC PANEL, COMPREHENSIVE    Collection Time: 10/01/17 10:45 AM   Result Value Ref Range    Sodium 136 136 - 145 mmol/L    Potassium 4.1 3.5 - 5.5 mmol/L    Chloride 100 100 - 108 mmol/L    CO2 30 21 - 32 mmol/L    Anion gap 6 3.0 - 18 mmol/L    Glucose 135 (H) 74 - 99 mg/dL    BUN 22 (H) 7.0 - 18 MG/DL    Creatinine 0.94 0.6 - 1.3 MG/DL    BUN/Creatinine ratio 23 (H) 12 - 20      GFR est AA >60 >60 ml/min/1.73m2    GFR est non-AA >60 >60 ml/min/1.73m2    Calcium 9.0 8.5 - 10.1 MG/DL    Bilirubin, total 1.1 (H) 0.2 - 1.0 MG/DL    ALT (SGPT) 14 13 - 56 U/L    AST (SGOT) 14 (L) 15 - 37 U/L    Alk.  phosphatase 170 (H) 45 - 117 U/L Protein, total 7.2 6.4 - 8.2 g/dL    Albumin 3.3 (L) 3.4 - 5.0 g/dL    Globulin 3.9 2.0 - 4.0 g/dL    A-G Ratio 0.8 0.8 - 1.7     CARDIAC PANEL,(CK, CKMB & TROPONIN)    Collection Time: 10/01/17 10:45 AM   Result Value Ref Range    CK 31 26 - 192 U/L    CK - MB <1.0 <3.6 ng/ml    CK-MB Index  0.0 - 4.0 %     CALCULATION NOT PERFORMED WHEN RESULT IS BELOW LINEAR LIMIT    Troponin-I, Qt. <0.02 0.0 - 0.045 NG/ML   NT-PRO BNP    Collection Time: 10/01/17 10:45 AM   Result Value Ref Range    NT pro- 0 - 900 PG/ML   CULTURE, BLOOD    Collection Time: 10/01/17 10:45 AM   Result Value Ref Range    Special Requests: PERIPHERAL      Culture result: PENDING    CULTURE, BLOOD    Collection Time: 10/01/17 11:00 AM   Result Value Ref Range    Special Requests: PERIPHERAL      Culture result: PENDING    POC LACTIC ACID    Collection Time: 10/01/17 11:05 AM   Result Value Ref Range    Lactic Acid (POC) 1.3 0.4 - 2.0 mmol/L   URINALYSIS W/ RFLX MICROSCOPIC    Collection Time: 10/01/17 11:35 AM   Result Value Ref Range    Color YELLOW      Appearance CLEAR      Specific gravity 1.009 1.005 - 1.030      pH (UA) 5.5 5.0 - 8.0      Protein NEGATIVE  NEG mg/dL    Glucose NEGATIVE  NEG mg/dL    Ketone NEGATIVE  NEG mg/dL    Bilirubin NEGATIVE  NEG      Blood NEGATIVE  NEG      Urobilinogen 0.2 0.2 - 1.0 EU/dL    Nitrites NEGATIVE  NEG      Leukocyte Esterase MODERATE (A) NEG     URINE MICROSCOPIC ONLY    Collection Time: 10/01/17 11:35 AM   Result Value Ref Range    WBC 36 to 50 0 - 4 /hpf    RBC 2 to 4 0 - 5 /hpf    Epithelial cells 1+ 0 - 5 /lpf    Bacteria 2+ (A) NEG /hpf       EKG Interpretation by ED physician:      X-ray, CT or radiology findings or impressions:  XR CHEST PORT    (Results Pending)       Progress notes, consult notes, or additional procedure notes:  Respiratory distress improved     Reevaluation of the patient:       Diagnosis:   1. Atrial fibrillation with RVR ([address]    2.  Acute exacerbation of chronic obstructive pulmonary disease (COPD) ([facility]        Disposition: admit    Follow-up Information     None           Patient's Medications   Start Taking    No medications on file   Continue Taking    ALBUTEROL (PROVENTIL VENTOLIN) 2.5 MG /3 ML (0.083 %) NEBULIZER SOLUTION    by Nebulization route once. AMITRIPTYLINE (ELAVIL) 25 MG TABLET    Take 50 mg by mouth nightly. CHOLECALCIFEROL (VITAMIN D3) 1,000 UNIT CAP    Take  by mouth daily. DICLOFENAC POTASSIUM (CATAFLAM) 50 MG TABLET    Take 50 mg by mouth two (2) times a day. Indications: RHEUMATOID ARTHRITIS    FUROSEMIDE (LASIX) 20 MG TABLET    Take  by mouth daily. HYDROCODONE-ACETAMINOPHEN (NORCO) 7.5-325 MG PER TABLET    Take 1 Tab by mouth every four (4) hours. These Medications have changed    No medications on file   Stop Taking    No medications on file       Scribe Attestation      Shanda Soto acting as a scribe for and in the presence of Lizy Brito MD        October 01, 2017 at 10:32 AM       Provider Attestation:      I personally performed the services described in the documentation, reviewed the documentation, as recorded by the scribe in my presence, and it accurately and completely records my words and actions.  October 01, 2017 at 10:32 AM - Lizy Brito MD

## 2017-10-01 NOTE — PROGRESS NOTES
met with patient completed the initial Spiritual Assessment of the patient, and offered Pastoral Care, see flow sheets for interventions. Patient does not have any Adventist/cultural needs that will affect patients preferences in health care. The patient was informed that chaplains will continue to follow and will provide pastoral care on an as needed/requested basis.       Stew Quinonez, MPH, [id number] [phone]

## 2017-10-01 NOTE — H&P
Medicine History and Physical    Patient: Camden Lantigua   Age:  65 y.o. Assessment   Principal Problem:    Atrial fibrillation with RVR ([facility] (10/1/2017)    Active Problems:    COPD exacerbation ([facility] [address] (10/1/2017)      Atrial fibrillation ([facility] (10/1/2017)          Plan     1)  Afib RVR   - dilt drip, wean as tolerated, start oral when bp can tolerate   - discussed anticoagulation, may need to start tomorrow   - cards consult    2)  Copd exacerbation   - no steriod 2/2 allergy, duoneb    3)  Chronic knee pain/arthritis   - pain meds prn    4)  Mild peripheral edema   - echo, lasix 40 mg oral daily    DISPO    Anticipated Date of Discharge: 1-2 days  Anticipated Disposition (home, SNF) : home    Chief Complaint:   Chief Complaint   Patient presents with    Shortness of Breath         HPI:   Camden Lantigua is a 65y.o. year old female who presents with  SOB. States shortness of breat has been going on for days to weeks worsening over last 24 hrs. No Chest pain, N/V, fever associated. She states there has been some swelling. She does not have any cardiac hx, she is obese. She does not have HTN and has diet controlled dm. When seen today on dilt drip for afib RVR with improving rate now in 110s. She is starting to feel better with rate control. Did not feel palpitations      Review of Systems - positive responses in bold type   Constitutional: Negative for fever, chills, diaphoresis and unexpected weight change. HENT: Negative for ear pain, congestion, sore throat, rhinorrhea, drooling, trouble swallowing, neck pain and tinnitus. Eyes: Negative for photophobia, pain, redness and visual disturbance. Respiratory: negative for shortness of breath, cough, choking, chest tightness, wheezing or stridor. Cardiovascular: Negative for chest pain, palpitations and leg swelling.    Gastrointestinal: Negative for nausea, vomiting, abdominal pain, diarrhea, constipation, blood in stool, abdominal distention and anal bleeding. Genitourinary: Negative for dysuria, urgency, frequency, hematuria, flank pain and difficulty urinating. Musculoskeletal: Negative for back pain and arthralgias. Skin: Negative for color change, rash and wound. Neurological: Negative for dizziness, seizures, syncope, speech difficulty, light-headedness or headaches. Hematological: Does not bruise/bleed easily. Psychiatric/Behavioral: Negative for suicidal ideas, hallucinations, behavioral problems, self-injury or agitation       Past Medical History:  Past Medical History:   Diagnosis Date    Arthritis     Asthma     Chronic obstructive pulmonary disease ([facility] [address]        Past Surgical History:  History reviewed. No pertinent surgical history. Family History:  History reviewed. No pertinent family history. Social History:  Social History     Social History    Marital status:      Spouse name: N/A    Number of children: N/A    Years of education: N/A     Social History Main Topics    Smoking status: Passive Smoke Exposure - Never Smoker    Smokeless tobacco: Never Used    Alcohol use No    Drug use: No    Sexual activity: Not Asked     Other Topics Concern    None     Social History Narrative    None       Home Medications:  Prior to Admission medications    Medication Sig Start Date End Date Taking? Authorizing Provider   HYDROcodone-acetaminophen (NORCO) 7.5-325 mg per tablet Take 1 Tab by mouth every four (4) hours. Yes Kris Hinojosa MD   furosemide (LASIX) 20 mg tablet Take  by mouth daily. Yes Kris Hinojosa MD   amitriptyline (ELAVIL) 25 mg tablet Take 50 mg by mouth nightly. Yes Kris Hinojosa MD   diclofenac potassium (CATAFLAM) 50 mg tablet Take 50 mg by mouth two (2) times a day. Indications: RHEUMATOID ARTHRITIS   Yes Kris Hinojosa MD   cholecalciferol (VITAMIN D3) 1,000 unit cap Take  by mouth daily.    Yes Kris Hinojosa MD   albuterol (PROVENTIL VENTOLIN) 2.5 mg /3 mL (0.083 %) nebulizer solution by Nebulization route once. Yes Phys Other, MD       Allergies: Allergies   Allergen Reactions    Peanut Anaphylaxis    Sting, Bee Anaphylaxis    Biaxin [Clarithromycin] Angioedema    Penicillins Angioedema    Prednisone Angioedema           Physical Exam:     Visit Vitals    /57    Pulse (!) 107    Temp 97.9 °F (36.6 °C)    Resp 25    Ht 5' 4\" (1.626 m)    Wt 135.2 kg (298 lb)    SpO2 94%    BMI 51.15 kg/m2       Physical Exam:  General appearance: alert, cooperative, no distress, appears stated age  Head: Normocephalic, without obvious abnormality, atraumatic  Neck: supple, trachea midline  Lungs:decreased basilar breath sounds  Heart: irreg irreg  Abdomen: soft, non-tender. Bowel sounds normal. No masses,  no organomegaly  Extremities: 1+ b/l LE edema  Skin: Skin color, texture, turgor normal. No rashes or lesions  Neurologic: Grossly normal    Intake and Output:  Current Shift:  10/01 0701 - 10/01 1900  In: 400 [P.O.:200;  I.V.:200]  Out: 1500 [Urine:1500]  Last three shifts:       Lab/Data Reviewed:  BMP:   Lab Results   Component Value Date/Time     10/01/2017 10:45 AM    K 4.1 10/01/2017 10:45 AM     10/01/2017 10:45 AM    CO2 30 10/01/2017 10:45 AM    AGAP 6 10/01/2017 10:45 AM     (H) 10/01/2017 10:45 AM    BUN 22 (H) 10/01/2017 10:45 AM    CREA 0.94 10/01/2017 10:45 AM    GFRAA >60 10/01/2017 10:45 AM    GFRNA >60 10/01/2017 10:45 AM     CMP:   Lab Results   Component Value Date/Time     10/01/2017 10:45 AM    K 4.1 10/01/2017 10:45 AM     10/01/2017 10:45 AM    CO2 30 10/01/2017 10:45 AM    AGAP 6 10/01/2017 10:45 AM     (H) 10/01/2017 10:45 AM    BUN 22 (H) 10/01/2017 10:45 AM    CREA 0.94 10/01/2017 10:45 AM    GFRAA >60 10/01/2017 10:45 AM    GFRNA >60 10/01/2017 10:45 AM    CA 9.0 10/01/2017 10:45 AM    ALB 3.3 (L) 10/01/2017 10:45 AM    TP 7.2 10/01/2017 10:45 AM    GLOB 3.9 10/01/2017 10:45 AM    AGRAT 0.8 10/01/2017 10:45 AM    SGOT 14 (L) 10/01/2017 10:45 AM    ALT 14 10/01/2017 10:45 AM     CBC:   Lab Results   Component Value Date/Time    WBC 11.7 10/01/2017 10:45 AM    HGB 13.6 10/01/2017 10:45 AM    HCT 43.6 10/01/2017 10:45 AM     10/01/2017 10:45 AM     All Cardiac Markers in the last 24 hours:   Lab Results   Component Value Date/Time    CPK 31 10/01/2017 10:45 AM    CKMB <1.0 10/01/2017 10:45 AM    CKND1  10/01/2017 10:45 AM     CALCULATION NOT PERFORMED WHEN RESULT IS BELOW LINEAR LIMIT    TROIQ <0.02 10/01/2017 10:45 AM       Chest X-Ray is obtained; CXR reviewed independently -Elevated R hemidiaphragm      Leela Richmond MD    October 1, 2017

## 2017-10-01 NOTE — PROGRESS NOTES
1615-Rec'd pt from ED on 4 lpm NC  -pt awake & alert - no distress observed  -add humidity & administer DuoNeb - held albuterol a appears either duplicate in error or PRN

## 2017-10-02 LAB
ANION GAP SERPL CALC-SCNC: 6 MMOL/L (ref 3–18)
ATRIAL RATE: 111 BPM
ATRIAL RATE: 118 BPM
ATRIAL RATE: 66 BPM
BUN SERPL-MCNC: 23 MG/DL (ref 7–18)
BUN/CREAT SERPL: 26 (ref 12–20)
CA-I SERPL-SCNC: 1.05 MMOL/L (ref 1.12–1.32)
CALCIUM SERPL-MCNC: 8.8 MG/DL (ref 8.5–10.1)
CALCULATED P AXIS, ECG09: 49 DEGREES
CALCULATED R AXIS, ECG10: -149 DEGREES
CALCULATED R AXIS, ECG10: -4 DEGREES
CALCULATED R AXIS, ECG10: 45 DEGREES
CALCULATED T AXIS, ECG11: -16 DEGREES
CALCULATED T AXIS, ECG11: 0 DEGREES
CALCULATED T AXIS, ECG11: 0 DEGREES
CHLORIDE SERPL-SCNC: 97 MMOL/L (ref 100–108)
CO2 SERPL-SCNC: 34 MMOL/L (ref 21–32)
CREAT SERPL-MCNC: 0.87 MG/DL (ref 0.6–1.3)
DIAGNOSIS, 93000: NORMAL
ERYTHROCYTE [DISTWIDTH] IN BLOOD BY AUTOMATED COUNT: 14.9 % (ref 11.6–14.5)
GLUCOSE SERPL-MCNC: 145 MG/DL (ref 74–99)
HCT VFR BLD AUTO: 42.7 % (ref 35–45)
HGB BLD-MCNC: 13.3 G/DL (ref 12–16)
MAGNESIUM SERPL-MCNC: 2.2 MG/DL (ref 1.6–2.6)
MCH RBC QN AUTO: 30.3 PG (ref 24–34)
MCHC RBC AUTO-ENTMCNC: 31.1 G/DL (ref 31–37)
MCV RBC AUTO: 97.3 FL (ref 74–97)
P-R INTERVAL, ECG05: 148 MS
P-R INTERVAL, ECG05: 180 MS
PHOSPHATE SERPL-MCNC: 4.4 MG/DL (ref 2.5–4.9)
PLATELET # BLD AUTO: 239 K/UL (ref 135–420)
PMV BLD AUTO: 10.7 FL (ref 9.2–11.8)
POTASSIUM SERPL-SCNC: 4.2 MMOL/L (ref 3.5–5.5)
Q-T INTERVAL, ECG07: 274 MS
Q-T INTERVAL, ECG07: 340 MS
Q-T INTERVAL, ECG07: 418 MS
QRS DURATION, ECG06: 130 MS
QRS DURATION, ECG06: 146 MS
QRS DURATION, ECG06: 52 MS
QTC CALCULATION (BEZET), ECG08: 422 MS
QTC CALCULATION (BEZET), ECG08: 438 MS
QTC CALCULATION (BEZET), ECG08: 480 MS
RBC # BLD AUTO: 4.39 M/UL (ref 4.2–5.3)
SODIUM SERPL-SCNC: 137 MMOL/L (ref 136–145)
VENTRICULAR RATE, ECG03: 120 BPM
VENTRICULAR RATE, ECG03: 143 BPM
VENTRICULAR RATE, ECG03: 66 BPM
WBC # BLD AUTO: 10.2 K/UL (ref 4.6–13.2)

## 2017-10-02 PROCEDURE — 74011250637 HC RX REV CODE- 250/637: Performed by: PHYSICIAN ASSISTANT

## 2017-10-02 PROCEDURE — 97162 PT EVAL MOD COMPLEX 30 MIN: CPT

## 2017-10-02 PROCEDURE — 74011250636 HC RX REV CODE- 250/636: Performed by: HOSPITALIST

## 2017-10-02 PROCEDURE — 74011250637 HC RX REV CODE- 250/637: Performed by: INTERNAL MEDICINE

## 2017-10-02 PROCEDURE — 85027 COMPLETE CBC AUTOMATED: CPT | Performed by: HOSPITALIST

## 2017-10-02 PROCEDURE — 80048 BASIC METABOLIC PNL TOTAL CA: CPT | Performed by: HOSPITALIST

## 2017-10-02 PROCEDURE — 74011250637 HC RX REV CODE- 250/637: Performed by: HOSPITALIST

## 2017-10-02 PROCEDURE — 65660000000 HC RM CCU STEPDOWN

## 2017-10-02 PROCEDURE — 97530 THERAPEUTIC ACTIVITIES: CPT

## 2017-10-02 PROCEDURE — 94640 AIRWAY INHALATION TREATMENT: CPT

## 2017-10-02 PROCEDURE — 93306 TTE W/DOPPLER COMPLETE: CPT

## 2017-10-02 PROCEDURE — 74011000258 HC RX REV CODE- 258: Performed by: HOSPITALIST

## 2017-10-02 PROCEDURE — 36415 COLL VENOUS BLD VENIPUNCTURE: CPT | Performed by: HOSPITALIST

## 2017-10-02 PROCEDURE — 74011000250 HC RX REV CODE- 250: Performed by: HOSPITALIST

## 2017-10-02 PROCEDURE — 94760 N-INVAS EAR/PLS OXIMETRY 1: CPT

## 2017-10-02 PROCEDURE — 97535 SELF CARE MNGMENT TRAINING: CPT

## 2017-10-02 PROCEDURE — 84100 ASSAY OF PHOSPHORUS: CPT | Performed by: HOSPITALIST

## 2017-10-02 PROCEDURE — 74011250636 HC RX REV CODE- 250/636: Performed by: INTERNAL MEDICINE

## 2017-10-02 PROCEDURE — 97166 OT EVAL MOD COMPLEX 45 MIN: CPT

## 2017-10-02 PROCEDURE — 93005 ELECTROCARDIOGRAM TRACING: CPT

## 2017-10-02 PROCEDURE — 82330 ASSAY OF CALCIUM: CPT | Performed by: HOSPITALIST

## 2017-10-02 PROCEDURE — 83735 ASSAY OF MAGNESIUM: CPT | Performed by: HOSPITALIST

## 2017-10-02 RX ORDER — DEXAMETHASONE 4 MG/1
6 TABLET ORAL EVERY 12 HOURS
Status: DISCONTINUED | OUTPATIENT
Start: 2017-10-02 | End: 2017-10-04 | Stop reason: HOSPADM

## 2017-10-02 RX ORDER — FUROSEMIDE 10 MG/ML
40 INJECTION INTRAMUSCULAR; INTRAVENOUS ONCE
Status: COMPLETED | OUTPATIENT
Start: 2017-10-02 | End: 2017-10-02

## 2017-10-02 RX ORDER — ASPIRIN 81 MG/1
162 TABLET ORAL DAILY
Status: DISCONTINUED | OUTPATIENT
Start: 2017-10-02 | End: 2017-10-03

## 2017-10-02 RX ORDER — METOPROLOL TARTRATE 25 MG/1
12.5 TABLET, FILM COATED ORAL EVERY 12 HOURS
Status: DISCONTINUED | OUTPATIENT
Start: 2017-10-02 | End: 2017-10-04 | Stop reason: HOSPADM

## 2017-10-02 RX ADMIN — IPRATROPIUM BROMIDE AND ALBUTEROL SULFATE 3 ML: .5; 3 SOLUTION RESPIRATORY (INHALATION) at 20:37

## 2017-10-02 RX ADMIN — ASPIRIN 162 MG: 81 TABLET, COATED ORAL at 10:32

## 2017-10-02 RX ADMIN — IPRATROPIUM BROMIDE AND ALBUTEROL SULFATE 3 ML: .5; 3 SOLUTION RESPIRATORY (INHALATION) at 09:15

## 2017-10-02 RX ADMIN — HYDROCODONE BITARTRATE AND ACETAMINOPHEN 1 TABLET: 7.5; 325 TABLET ORAL at 20:37

## 2017-10-02 RX ADMIN — HYDROCODONE BITARTRATE AND ACETAMINOPHEN 1 TABLET: 7.5; 325 TABLET ORAL at 11:16

## 2017-10-02 RX ADMIN — DEXAMETHASONE 6 MG: 2 TABLET ORAL at 20:36

## 2017-10-02 RX ADMIN — HEPARIN SODIUM 5000 UNITS: 5000 INJECTION, SOLUTION INTRAVENOUS; SUBCUTANEOUS at 08:07

## 2017-10-02 RX ADMIN — CALCIUM GLUCONATE 2 G: 94 INJECTION, SOLUTION INTRAVENOUS at 15:40

## 2017-10-02 RX ADMIN — METOPROLOL TARTRATE 12.5 MG: 25 TABLET ORAL at 20:38

## 2017-10-02 RX ADMIN — IPRATROPIUM BROMIDE AND ALBUTEROL SULFATE 3 ML: .5; 3 SOLUTION RESPIRATORY (INHALATION) at 13:47

## 2017-10-02 RX ADMIN — HEPARIN SODIUM 5000 UNITS: 5000 INJECTION, SOLUTION INTRAVENOUS; SUBCUTANEOUS at 00:01

## 2017-10-02 RX ADMIN — LEVOFLOXACIN 750 MG: 5 INJECTION, SOLUTION INTRAVENOUS at 13:00

## 2017-10-02 RX ADMIN — METOPROLOL TARTRATE 12.5 MG: 25 TABLET ORAL at 10:32

## 2017-10-02 RX ADMIN — HYDROCODONE BITARTRATE AND ACETAMINOPHEN 1 TABLET: 7.5; 325 TABLET ORAL at 04:39

## 2017-10-02 RX ADMIN — HYDROCODONE BITARTRATE AND ACETAMINOPHEN 1 TABLET: 7.5; 325 TABLET ORAL at 16:50

## 2017-10-02 RX ADMIN — HEPARIN SODIUM 5000 UNITS: 5000 INJECTION, SOLUTION INTRAVENOUS; SUBCUTANEOUS at 16:51

## 2017-10-02 RX ADMIN — DEXAMETHASONE 6 MG: 2 TABLET ORAL at 13:06

## 2017-10-02 RX ADMIN — IPRATROPIUM BROMIDE AND ALBUTEROL SULFATE 3 ML: .5; 3 SOLUTION RESPIRATORY (INHALATION) at 01:44

## 2017-10-02 RX ADMIN — FUROSEMIDE 40 MG: 10 INJECTION, SOLUTION INTRAMUSCULAR; INTRAVENOUS at 10:32

## 2017-10-02 RX ADMIN — HYDROCODONE BITARTRATE AND ACETAMINOPHEN 1 TABLET: 7.5; 325 TABLET ORAL at 00:01

## 2017-10-02 NOTE — PROGRESS NOTES
Problem: Self Care Deficits Care Plan (Adult)  Goal: *Acute Goals and Plan of Care (Insert Text)  Occupational Therapy Goals  Initiated 10/2/2017 within 7 day(s). 1. Patient will perform grooming tasks at EOB with supervision for balance. 2. Patient will perform lower body dressing with supervision and AE, prn.  3. Patient will perform functional task in standing with support for 8 minutes with <2 rest breaks o increase activity tolerance for ADLs. 4. Patient will perform toilet transfers with supervision/set-up. 5. Patient will perform all aspects of toileting with supervision/set-up. 6. Patient will participate in upper extremity therapeutic exercise/activities with supervision/set-up for 8 minutes to increase BUE strength for ADLs. 7. Patient will utilize energy conservation techniques during functional activities with minimal verbal cues. Outcome: Progressing Towards Goal  OCCUPATIONAL THERAPY EVALUATION     Patient: Amy Clark (60 y.o. female)  Date: 10/2/2017  Primary Diagnosis: Atrial fibrillation ([facility] [address]        Precautions:  Fall (oxygen)      ASSESSMENT :  Based on the objective data described below, the patient presents with impairments with regard to bed mobility, activity tolerance and independence in ADLs secondary to SOB. Pt drowsy at EOB on arrival, agreeable to therapy. Pt was mod I in ADLs & functional mobility (RW; WC) PTA. Min A x1 & additional time to transfer to [facility] with max vc's for BUE positioning. Pt voided;, max A for pericare secondary to poor dynamic standing balance. Min A x2 (for safety) to return to EOB from [facility]. Pt desat to 88% with vc's for proper breathing at EOB. During bed mobility (sit --> chair position), pt desat to 85% & becoming anxious; multiple rest breaks required during bed mobility. Pt left in chair position with needs within reach; re-oriented to time of day. Recommend continued therapy.      Patient will benefit from skilled intervention to address the above impairments. Patients rehabilitation potential is considered to be Good  Factors which may influence rehabilitation potential include:   [ ]             None noted  [ ]             Mental ability/status  [X]             Medical condition  [ ]             Home/family situation and support systems  [ ]             Safety awareness  [ ]             Pain tolerance/management  [ ]             Other:      Recommendations for nursing: up with assist x1-2 & RW  Written on communication board: yes       PLAN :  Recommendations and Planned Interventions:  [X]               Self Care Training                  [X]        Therapeutic Activities  [X]               Functional Mobility Training    [ ]        Cognitive Retraining  [X]               Therapeutic Exercises           [X]        Endurance Activities  [X]               Balance Training                   [ ]        Neuromuscular Re-Education  [ ]               Visual/Perceptual Training     [X]   Home Safety Training  [X]               Patient Education                 [X]        Family Training/Education  [ ]               Other (comment):     Frequency/Duration: Patient will be followed by occupational therapy 3-5 times a week to address goals. Discharge Recommendations: Home Health vs. Rehab pending progress with O2 during functional mobility  Further Equipment Recommendations for Discharge: shower chair       SUBJECTIVE:   Patient stated I can't lay back. \"      OBJECTIVE DATA SUMMARY:       Past Medical History:   Diagnosis Date    Arthritis      Asthma      Chronic obstructive pulmonary disease ([facility] [address]     History reviewed. No pertinent surgical history. Barriers to Learning/Limitations: None  Compensate with: visual, verbal, tactile, kinesthetic cues/model     GCODES:  Self Care  Current  CL= 60-79%   Goal  CJ= 20-39%.   The severity rating is based on the Other Functional Assessment, MMT, ROM     Eval Complexity: History: MEDIUM Complexity : Expanded review of history including physical, cognitive and psychosocial  history ; Examination: MEDIUM Complexity : 3-5 performance deficits relating to physical, cognitive , or psychosocial skils that result in activity limitations and / or participation restrictions; Decision Making:MEDIUM Complexity : Patient may present with comorbidities that affect occupational performnce. Miniml to moderate modification of tasks or assistance (eg, physical or verbal ) with assesment(s) is necessary to enable patient to complete evaluation      Prior Level of Function/Home Situation: Pt was mod I in basic self care tasks and utilized rolling walker & wheelchair for functional mobility PTA. Home Situation  Home Environment: Apartment  # Steps to Enter: 0  One/Two Story Residence: One story  Living Alone: No  Support Systems: Family member(s)  Patient Expects to be Discharged to[de-identified] Apartment  Current DME Used/Available at Home: Grab bars, Walker, rolling, Wheelchair  Tub or Shower Type: Tub/Shower combination (has grab bars; no shower chair)  [ ]  Right hand dominant           [ ]  Left hand dominant  Cognitive/Behavioral Status:  Neurologic State: Alert  Orientation Level: Oriented X4  Cognition: Appropriate decision making; Appropriate safety awareness; Appropriate for age attention/concentration; Follows commands  Safety/Judgement: Awareness of environment; Fall prevention      Skin: Intact (BUEs)  Edema: None noted (BUEs)     Vision/Perceptual:    Acuity: Within Defined Limits       Coordination:  Coordination: Within functional limits (BUEs)  Fine Motor Skills-Upper: Right Intact; Left Intact    Gross Motor Skills-Upper: Right Intact; Left Intact      Balance:  Sitting: Impaired  Sitting - Static: Fair (occasional)  Sitting - Dynamic: Fair (occasional)  Standing: Impaired; With support  Standing - Static: Fair  Standing - Dynamic : Fair (Fair-)      Strength:  Strength: Generally decreased, functional (BUEs: 3+/5)     Range of Motion:  AROM: Generally decreased, functional (BUEs: approx 3/4 shoulder flex)     Functional Mobility and Transfers for ADLs:  Bed Mobility:  Rolling: Moderate assistance  Supine to Sit:  (pt sitting at EOB on arrival)  Sit to Supine: Moderate assistance; Additional time  Scooting: Moderate assistance; Additional time      Transfers:  Sit to Stand: Minimum assistance; Additional time;Assist x2              Toilet Transfer : Minimum assistance; Additional time;Assist x2     ADL Assessment:  Feeding: Setup;Supervision  Oral Facial Hygiene/Grooming: Setup;Supervision  Bathing: Maximum assistance  Upper Body Dressing: Minimum assistance  Lower Body Dressing: Maximum assistance  Toileting: Maximum assistance     ADL Intervention:  Toileting  Toileting Assistance: Maximum assistance  Bladder Hygiene: Maximum assistance  Clothing Management: Minimum assistance  Cues: Physical assistance for pants down     Min A x1 & additional time to transfer to [facility] with max vc's for BUE positioning. Pt voided and required max A for pericare secondary to poor dynamic standing balance. Min A x2 (for safety) to return to EOB from [facility]. Pt desat to 88% with vc's for proper breathing at EOB. Cognitive Retraining  Safety/Judgement: Awareness of environment; Fall prevention      Pain:  Pre treatment pain level: 0/10  Post treatment pain level: 0/10  Pain Scale 1: Numeric (0 - 10)     Activity Tolerance:  Poor+  Please refer to the flowsheet for vital signs taken during this treatment. After treatment:   [ ] Patient left in no apparent distress sitting up in chair  [X] Patient left in no apparent distress in bed in chair position  [X] Call bell left within reach  [X] Nursing notified  [ ] Caregiver present  [ ] Bed alarm activated      COMMUNICATION/EDUCATION: Pt educated on role of OT, POC, and home safety. She verbalized understanding.   [X] Home safety education was provided and the patient/caregiver indicated understanding.   [X] Patient/family have participated as able in goal setting and plan of care. [X] Patient/family agree to work toward stated goals and plan of care. [ ] Patient understands intent and goals of therapy, but is neutral about his/her participation. [ ] Patient is unable to participate in goal setting and plan of care.      Thank you for this referral.     Phyliss Castleman, MS OTR/L  Time Calculation: 28 mins

## 2017-10-02 NOTE — PROGRESS NOTES
Problem: Mobility Impaired (Adult and Pediatric)  Goal: *Acute Goals and Plan of Care (Insert Text)  Physical Therapy Goals  Initiated 10/2/2017 and to be accomplished within 7 day(s)  1. Patient will move from supine to sit and sit to supine in bed with modified independence. 2. Patient will transfer from bed to chair and chair to bed with modified independence using the least restrictive device. 3. Patient will perform sit to stand with modified independence. 4. Patient will ambulate with modified independence for 10 feet with the least restrictive device. Outcome: Progressing Towards Goal  PHYSICAL THERAPY EVALUATION     Patient: William Kim (2 y.o. female)  Date: 10/2/2017  Primary Diagnosis: Atrial fibrillation [facility])  Precautions: Fall      ASSESSMENT :  Patient requires between minimal assistance/contact guard assist and supervision/set-up for bed mobility, transfers and ambulation. PURA Miller cleared patient for PT. Seated at side of bed upon entry. Supervision for balance. BLE strength 5/5. Completed sit to stand with min A. Impulsive with position changes to utilize momentum for movement. Min A for balance in standing. Remained standing 30 seconds. Completed lower body dressing with mod A. Completed sit to stand again to pull up lower body garments with min A. Returned to seated at EOB. Transfer from bed to wheelchair with min A. Cues to stand with good upright posture with transfer; patient reaching for chair. Seated in chair with PURA Miller preparing for transfer. Educated on safety techniques, role of PT and plan of care. Verbalized understanding. Patient presents with deficits in:  Bed Mobility, Transfers, Gait, Strength, Balance and Stairs     Patient will benefit from skilled intervention to address the above impairments.   Patients rehabilitation potential is considered to be Fair  Factors which may influence rehabilitation potential include:   [ ]         None noted  [ ] Mental ability/status  [X]         Medical condition  [ ]         Home/family situation and support systems  [X]         Safety awareness  [ ]         Pain tolerance/management  [ ]         Other:        PLAN :  Recommendations and Planned Interventions:  [X]           Bed Mobility Training             [X]    Neuromuscular Re-Education  [X]           Transfer Training                   [ ]    Orthotic/Prosthetic Training  [X]           Gait Training                          [ ]    Modalities  [X]           Therapeutic Exercises          [ ]    Edema Management/Control  [X]           Therapeutic Activities            [X]    Patient and Family Training/Education  [ ]           Other (comment):     Frequency/Duration: Patient will be followed by physical therapy 3-5 times a week to address goals. Discharge Recommendations: Home Health  Further Equipment Recommendations for Discharge: straight cane, rolling walker and wheelchair        SUBJECTIVE:   Patient stated That's my paint shirt.       OBJECTIVE DATA SUMMARY:       Past Medical History:   Diagnosis Date    Arthritis      Asthma      Chronic obstructive pulmonary disease ([facility] [address]     History reviewed. No pertinent surgical history. Barriers to Learning/Limitations: None  Compensate with: visual, verbal, tactile, kinesthetic cues/model     G CODES:Mobility  Current  CL= 60-79%   Goal  CI= 1-19%.   The severity rating is based on the Other [facility] Balance Scale 1+/5     Eval Complexity: History: MEDIUM  Complexity : 1-2 comorbidities / personal factors will impact the outcome/ POC Exam:MEDIUM Complexity : 3 Standardized tests and measures addressing body structure, function, activity limitation and / or participation in recreation  Presentation: MEDIUM Complexity : Evolving with changing characteristics  Clinical Decision Making:Medium Complexity [facility] Standing Balance Scale 1+/5 Overall Complexity:MEDIUM     [place] Sitting Balance Scale 3/5  0: Pt performs 25% or less of sitting activity (Max assist) CN, 100% impaired. 1: Pt supports self with upper extremities but requires therapist assistance. Pt performs 25-50% of effort (Mod assist) CM, 80% to <100% impaired. 1+: Pt supports self with upper extremities but requires therapist assistance. Pt performs >50% effort. (Min assist). CL, 60% to <80% impaired. 2: Pt supports self independently with both upper extremities. CL, 60% to <80% impaired. 2+: Pt support self independently with 1 upper extremity. CK, 40% to <60% impaired. 3: Pt sits without upper extremity support for up to 30 seconds. CK, 40% to <60% impaired. 3+: Pt sits without upper extremity support for 30 seconds or greater. CJ, 20% to <40% impaired. 4: Pt moves and returns trunkal midpoint 1-2 inches in one plane. CJ, 20% to <40% impaired. 4+: Pt moves and returns trunkal midpoint 1-2 inches in multiple planes. CI, 1% to <20% impaired. 5: Pt moves and returns trunkal midpoint in all planes greater than 2 inches. CH, 0% impaired. [facility] Standing Balance Scale 1+/5  0: Pt performs 25% or less of standing activity (Max assist) CN, 100% impaired. 1: Pt supports self with upper extremities but requires therapist assistance. Pt performs 25-50% of effort (Mod assist) CM, 80% to <100% impaired. 1+: Pt supports self with upper extremities but requires therapist assistance. Pt performs >50% effort. (Min assist). CL, 60% to <80% impaired. 2: Pt supports self independently with both upper extremities (walker, crutches, parallel bars). CL, 60% to <80% impaired. 2+: Pt support self independently with 1 upper extremity (cane, crutch, 1 parallel bar). CK, 40% to <60% impaired. 3: Pt stands without upper extremity support for up to 30 seconds. CK, 40% to <60% impaired. 3+: Pt stands without upper extremity support for 30 seconds or greater. CJ, 20% to <40% impaired.   4: Pt independently moves and returns center of gravity 1-2 inches in one plane. CJ, 20% to <40% impaired. 4+: Pt independently moves and returns center of gravity 1-2 inches in multiple planes. CI, 1% to <20% impaired. 5: Pt independently moves and returns center of gravity in all planes greater than 2 inches. CH, 0% impaired. Prior Level of Function/Home Situation: Per patient, uses wheelchair for majority of amb; also has walker for very short distances  Home Situation  Home Environment: Apartment  # Steps to Enter: 0  One/Two Story Residence: One story  Living Alone: No  Support Systems: Family member(s)  Patient Expects to be Discharged to[de-identified] Apartment  Current DME Used/Available at Home: Cane, straight, Walker, rolling, Wheelchair  Tub or Shower Type: Tub/Shower combination (has grab bars; no shower chair)  Critical Behavior:  Neurologic State: Alert  Orientation Level: Oriented to person;Oriented to place  Cognition: Appropriate decision making; Follows commands  Safety/Judgement: Decreased awareness of need for safety; Fall prevention  Psychosocial  Patient Behaviors: Cooperative  Strength:    Strength:  Within functional limits  Manual Muscle Testing (LE)                                                     R                     L                     Hip Flexion:                           5/5 5/5    Knee EXT:                            5/5 5/5  Knee FLEX:                            5/5 5/5      Ankle DF:                           5/5 5/5  _________________________________________________   Tone & Sensation:   Tone: Normal  Sensation: Intact  Range Of Motion:  AROM: Within functional limits  Functional Mobility:  Bed Mobility:  Supine to Sit:  (seated at EOB)  Sit to Supine:  (seated in wheelchair)  Transfers:  Sit to Stand: Minimum assistance  Stand to Sit: Minimum assistance  Bed to Chair: Minimum assistance  Balance:   Sitting: Impaired  Sitting - Static: Good (unsupported)  Sitting - Dynamic: Fair (occasional)  Standing: Impaired  Standing - Static: Fair  Standing - Dynamic : Poor  Ambulation/Gait Training:  Gait Description (WDL):  (not tested)  Therapeutic Exercises:   Sit to stand x3  Bed to chair transfer  Lower body dressing  Pain:  Pre treatment pain level:  Post treatment pain level:  Pain Scale 1: Numeric (0 - 10)  Pain Intensity 1: 2  Pain Location 1: Back  Pain Orientation 1: Left;Upper  Pain Description 1: Aching  Pain Intervention(s) 1: Medication (see MAR)  Activity Tolerance:   Fair     Please refer to the flowsheet for vital signs taken during this treatment. After treatment:   [X]         Patient left in no apparent distress sitting up in chair  [ ]         Patient left in no apparent distress in bed  [X]         Call bell left within reach  [X]         Nursing notified and present  [ ]         Caregiver present  [ ]         Bed alarm activated      COMMUNICATION/EDUCATION:   [X]         Fall prevention education was provided and the patient/caregiver indicated understanding. [X]         Patient/family have participated as able in goal setting and plan of care. [X]         Patient/family agree to work toward stated goals and plan of care. [ ]         Patient understands intent and goals of therapy, but is neutral about his/her participation. [ ]         Patient is unable to participate in goal setting and plan of care. Patient educated on the role of physical therapy during the acute stay  and the importance of mobility. MARK.        Thank you for this referral.  Ferdinand Jasso, PT   Time Calculation: 17 mins

## 2017-10-02 NOTE — PROGRESS NOTES
Leydi   Discharge Planning/ Assessment    Reasons for Intervention: Chart reviewed and I spoke with pt. She has [place] a and b insurance. Dr Dheeraj Bui is PCP, last seen 3 months ago, next visit soon. She lives with boy friend Cecilia Triana [phone] and he will drive and participate in dc process. At home she has a walker and wheel chair. OT recommended a possible shower chair for home. She does not have O2 or nebulizers at home, FYI placed for MD. Plan is home, MD stated dc maybe tomorrow.     High Risk Criteria  [x] Yes  []No   Physician Referral  [] Yes  [x]No        Date    Nursing Referral  [] Yes  [x]No        Date    Patient/Family Request  [] Yes  [x]No        Date       Resources:    Medicare  [x] Yes  []No   Medicaid  [] Yes  [x]No   No Resources  [] Yes  [x]No   Private Insurance  [] Yes  [x]No    Name/Phone Number    Other  [] Yes  [x]No        (i.e. Workman's Comp)         Prior Services:    Prior Services  [] Yes  [x]No   Home Health  [] Yes  [x]No   [address] [place]  [] Yes  [x]No        [address] Program  [] Yes  [x]No       Meals on Wheels  [] Yes  [x]No   Office on Aging  [] Yes  [x]No   Transportation Services  [] Yes  [x]No   Nursing Home  [] Yes  [x]No        Nursing Home Name    [address]  [] Yes  [x]No        [address] Name    Other       Information Source:      Information obtained from  [x] Patient  [] Parent   [] [address]   [] Child  [] Spouse   [] Significant Other/Partner   [] Friend      [] EMS    [] Nursing Home Chart          [] Other:   Chart Review  [x] Yes  []No     Family/Support System:    Patient lives with  [] Alone    [] Spouse   [x] Significant Other  [] Children  [] Caretaker   [] Parent  [] Sibling     [] Other       Other Support System:    Is the patient responsible for care of others  [] Yes  [x]No   Information of person caring for patient on  discharge    Managers financial affairs independently  [x] Yes  []No   If no, explain:      Status Prior to Admission:    Mental Status  [x] Awake  [] Alert  [] Oriented  [] Quiet/Calm [] Lethargic/Sedated   [] Disoriented  [] Restless/Anxious  [] Combative   Personal Care  [] Dependent  [x] [address]  [] Requires Assistance   Meal Preparation Ability  [x] Independent   [] Standby Assistance   [] Minimal Assistance   [] Moderate Assistance  [] Maximum Assistance     [] Total Assistance   Chores  [x] Independent with Chores   [] N/A Nursing Home Resident   [] Requires Assistance   Bowel/Bladder  [x] Continent  [] Catheter  [] Incontinent  [] Ostomy Self-Care    [] Urine Diversion Self-Care  [] Maximum Assistance     [] Total Assistance   Number of Persons needed for assistance    DME at home  [] Clerance [place], Sherral Cord  [] Clerance Spray, Straight   [] Commode    [] Bathroom/Grab Bars  [] Hospital Bed  [] Nebulizer  [] Oxygen           [] Raised Toilet Seat  [] Shower Chair  [] Side Rails for Bed   [] Tub Transfer Bench   [] Em Torres  [] Milka Case, Standard      [] Other:   Vendor      Treatment Presently Receiving:    Current Treatments  [] Chemotherapy  [] Dialysis  [] Insulin  [] IVAB [x] IVF   [x] O2  [] PCA   [] PT   [] RT   [] Tube Feedings   [] Wound Care     Psychosocial Evaluation:    Verbalized Knowledge of Disease Process  [] Patient  []Family   Coping with Disease Process  [] Patient  []Family   Requires Further Counseling Coping with Disease Process  [] Patient  []Family     Identified Projected Needs:    Home Health Aid  [] Yes  [x]No   Transportation  [] Yes  [x]No   Education  [] Yes  [x]No        Specific Education     Financial Counseling  [] Yes  [x]No   Inability to Care for Self/Will Require 24 hour care  [] Yes  [x]No   Pain Management  [] Yes  [x]No   Home Infusion Therapy  [] Yes  [x]No   Oxygen Therapy  [] Yes  [x]No   DME  [] Yes  [x]No   Long Term Care Placement  [] Yes  [x]No   Rehab  [] Yes  [x]No   Physical Therapy  [] Yes  [x]No   Needs Anticipated At This Time  [] Yes  [x]No     Intra-Hospital Referral:    [facility]  [] Yes  [x]No     [] Yes  [x]No   Patient Representative  [] Yes  [x]No   Staff for Teaching Needs  [] Yes  [x]No   Specialty Teaching Needs     Diabetic Educator  [] Yes  [x]No   Referral for Diabetic Educator Needed  [] Yes  [x]No  If Yes, place order for Nutritionist or Diabetic Consult     Tentative Discharge Plan:    Home with No Services  [x] Yes  []No   Home with [address]  [] Yes  [x]No        If Yes, specify type    Home Care Program  [] Yes  [x]No        If Yes, specify type    Meals on Wheels  [] Yes  [x]No   Office of Aging  [] Yes  [x]No   NHP  [] Yes  [x]No   Return to the Nursing Home  [] Yes  [x]No   Rehab Therapy  [] Yes  [x]No   Acute Rehab  [] Yes  [x]No   Subacute Rehab  [] Yes  [x]No   Private Care  [] Yes  [x]No   Substance Abuse Referral  [] Yes  [x]No   Transportation  [] Yes  [x]No   Chore Service  [] Yes  [x]No   Inpatient Hospice  [] Yes  [x]No   OP RT  [] Yes  [x] No   OP Hemo  [] Yes  [x] No   OP PT  [] Yes  [x]No   Support Group  [] Yes  [x]No   Reach to Recovery  [] Yes  [x]No   OP Oncology Clinic  [] Yes  [x]No   Clinic Appointment  [] Yes  [x]No   DME  [] Yes  [x]No   Comments    Name of D/C Planner or  Given to Patient or Family Collette Phy. Marice Feil RN   Phone Number         Extension [phone]   Date 10/02/17   Time    If you are discharged home, whom do you designate to participate in your discharge plan and receive any information needed?      Enter name of designee Nato        Phone # of designee         Address of designee         Updated         Patient refused to designate any           individual

## 2017-10-02 NOTE — PROGRESS NOTES
Medicine Progress Note    Patient: Vivienne Santos   Age:  65 y.o.  DOA: 10/1/2017   Admit Dx / CC: Atrial fibrillation ([facility] [address]  LOS:  LOS: 1 day     Assessment/Plan   Principal Problem:    Atrial fibrillation with RVR ([address] (10/1/2017)    Active Problems:    COPD exacerbation ([address] (10/1/2017)      Atrial fibrillation ([address] (10/1/2017)        Additional Plan notes     1)  Afib RVR                        - now on oral metoprolol, dilt drip stopped                        - Chads score 1. On asa                        - cards following     2)  Copd exacerbation                        - start dexamethasone- states pred allergy is mood change, duoneb, levaquin     3)  Chronic knee pain/arthritis                        - pain meds prn     4)  Mild peripheral edema                        - echo, lasix 40 mg oral daily    LIKELY DC TOMORROW      Subjective:   Patient seen and examined. No complaints today    Objective:     Visit Vitals    BP (!) 121/97    Pulse 83    Temp 97.8 °F (36.6 °C)    Resp 17    Ht 5' 4\" (1.626 m)    Wt 135.2 kg (298 lb)    SpO2 96%    BMI 51.15 kg/m2       Physical Exam:  General appearance: alert, cooperative, no distress, appears stated age  Head: Normocephalic, without obvious abnormality, atraumatic  Neck: supple, trachea midline  Lungs: clear to auscultation bilaterally  Heart: regular rate and rhythm, S1, S2 normal, no murmur, click, rub or gallop  Abdomen: soft, non-tender. Bowel sounds normal. No masses,  no organomegaly  Extremities: extremities normal, atraumatic, no cyanosis or edema  Skin: Skin color, texture, turgor normal. No rashes or lesions  Neurologic: Grossly normal    Intake and Output:  Current Shift:  10/02 0701 - 10/02 1900  In: 131.1 [P.O.:120; I.V.:11.1]  Out: 600 [Urine:600]  Last three shifts:  09/30 1901 - 10/02 0700  In: 712.4 [P.O.:320;  I.V.:392.4]  Out: 7426 [Urine:2575]    Lab/Data Reviewed:  CMP:   Lab Results   Component Value Date/Time     10/02/2017 04:05 AM    K 4.2 10/02/2017 04:05 AM    CL 97 (L) 10/02/2017 04:05 AM    CO2 34 (H) 10/02/2017 04:05 AM    AGAP 6 10/02/2017 04:05 AM     (H) 10/02/2017 04:05 AM    BUN 23 (H) 10/02/2017 04:05 AM    CREA 0.87 10/02/2017 04:05 AM    GFRAA >60 10/02/2017 04:05 AM    GFRNA >60 10/02/2017 04:05 AM    CA 8.8 10/02/2017 04:05 AM    MG 2.2 10/02/2017 04:05 AM    PHOS 4.4 10/02/2017 04:05 AM     CBC:   Lab Results   Component Value Date/Time    WBC 10.2 10/02/2017 04:05 AM    HGB 13.3 10/02/2017 04:05 AM    HCT 42.7 10/02/2017 04:05 AM     10/02/2017 04:05 AM     All Cardiac Markers in the last 24 hours:   Lab Results   Component Value Date/Time    TROIQ <0.02 10/01/2017 11:00 PM    TROIQ <0.02 10/01/2017 05:23 PM       Medications Reviewed:  Current Facility-Administered Medications   Medication Dose Route Frequency    aspirin delayed-release tablet 162 mg  162 mg Oral DAILY    metoprolol tartrate (LOPRESSOR) tablet 12.5 mg  12.5 mg Oral Q12H    dexamethasone (DECADRON) tablet 6 mg  6 mg Oral Q12H    amitriptyline (ELAVIL) tablet 50 mg  50 mg Oral QHS    HYDROcodone-acetaminophen (NORCO) 7.5-325 mg per tablet 1 Tab  1 Tab Oral Q4H    levoFLOXacin (LEVAQUIN) 750 mg in D5W IVPB  750 mg IntraVENous Q24H    acetaminophen (TYLENOL) tablet 650 mg  650 mg Oral Q4H PRN    ondansetron (ZOFRAN) injection 4 mg  4 mg IntraVENous Q4H PRN    heparin (porcine) injection 5,000 Units  5,000 Units SubCUTAneous Q8H    ELECTROLYTE REPLACEMENT PROTOCOL  1 Each Other PRN    ELECTROLYTE REPLACEMENT PROTOCOL  1 Each Other PRN    ELECTROLYTE REPLACEMENT PROTOCOL  1 Each Other PRN    ELECTROLYTE REPLACEMENT PROTOCOL  1 Each Other PRN    furosemide (LASIX) tablet 40 mg  40 mg Oral DAILY    albuterol-ipratropium (DUO-NEB) 2.5 MG-0.5 MG/3 ML  3 mL Nebulization Q6H RT    albuterol (PROVENTIL VENTOLIN) nebulizer solution 2.5 mg  2.5 mg Nebulization Q6H PRN       Jag Montalvo MD    October 2, 2017

## 2017-10-02 NOTE — PROGRESS NOTES
0855-Pt completed breakfast - now available  Awake & alert on 4 lpm NC - no distress observed or reported  -administer neb      0835-Rec'd pt on NC - pt awake & alert but eating breakfast  -attempt to administer neb - pt eating - requested I return a little later - stated no SOB    1347-Pt remains awake & alert -sitting upright on edge of bed - reports feeling better

## 2017-10-02 NOTE — ACP (ADVANCE CARE PLANNING)
Patient has designated _______Boy Friend_________________ to participate in his/her discharge plan and to receive any needed information.      Name: Dillon Soria  Address:  Phone number:[phone]

## 2017-10-02 NOTE — PROGRESS NOTES
Problem: Falls - Risk of  Goal: *Absence of Falls  Document Mary Fall Risk and appropriate interventions in the flowsheet. Outcome: Progressing Towards Goal  Fall Risk Interventions:  Mobility Interventions: Patient to call before getting OOB, OT consult for ADLs           Medication Interventions: Bed/chair exit alarm, Patient to call before getting OOB, Teach patient to arise slowly     Elimination Interventions: Call light in reach, Elevated toilet seat, Patient to call for help with toileting needs, Toileting schedule/hourly rounds, Toilet paper/wipes in reach     History of Falls Interventions: Door open when patient unattended, Investigate reason for fall, Room close to nurse's station           Problem: Afib Pathway: Day 1  Goal: Activity/Safety  Outcome: Resolved/Met Date Met:  10/02/17  Pt up to [facility], HR remain below 100. Goal: Nutrition/Diet  Outcome: Resolved/Met Date Met:  10/02/17  Pt tolerating diet  Goal: Medications  Outcome: Resolved/Met Date Met:  10/02/17  cardizem gtt being weaned off, pt in NSR. Goal: Respiratory  Outcome: Resolved/Met Date Met:  10/02/17  Pt using O2 4L NC.   Goal: *Stable cardiac rhythm  Outcome: Resolved/Met Date Met:  10/02/17  Pt now in NSR with PACs, BBB and inverted T wave

## 2017-10-02 NOTE — PROGRESS NOTES
6385  Assumed care of pt. Drowsy, oriented x4, Orthopneic on O2 4 lpm per n/c.  1500  TRANSFER - OUT REPORT:    Verbal report given to [facility] RN(name) on [employer] Shira  being transferred to [facility](unit) for routine progression of care       Report consisted of patients Situation, Background, Assessment and   Recommendations(SBAR). Information from the following report(s) SBAR, Kardex, ED Summary, MAR, Accordion and Cardiac Rhythm SR with 1*AVB and BBB, occaisional Afib was reviewed with the receiving nurse. Lines:   Peripheral IV 10/01/17 Right Hand (Active)   Site Assessment Clean, dry, & intact 10/2/2017  4:09 PM   Phlebitis Assessment 0 10/2/2017  4:09 PM   Infiltration Assessment 0 10/2/2017  4:09 PM   Dressing Status Clean, dry, & intact 10/2/2017  4:09 PM   Dressing Type Transparent;Tape 10/2/2017  4:09 PM   Hub Color/Line Status Pink;Flushed;Capped 10/2/2017  4:09 PM   Action Taken Open ports on tubing capped 10/2/2017  4:09 PM   Alcohol Cap Used Yes 10/2/2017  4:09 PM        Opportunity for questions and clarification was provided. Patient transported with:   Registered Nurse                  Problem: Falls - Risk of  Goal: *Absence of Falls  Document Mary Fall Risk and appropriate interventions in the flowsheet.    Outcome: Progressing Towards Goal  Fall Risk Interventions:  Mobility Interventions: Patient to call before getting OOB, OT consult for ADLs           Medication Interventions: Bed/chair exit alarm, Patient to call before getting OOB, Teach patient to arise slowly     Elimination Interventions: Call light in reach, Elevated toilet seat, Patient to call for help with toileting needs, Toileting schedule/hourly rounds, Toilet paper/wipes in reach     History of Falls Interventions: Door open when patient unattended, Investigate reason for fall, Room close to nurse's station

## 2017-10-02 NOTE — CONSULTS
[facility]  10/2/2017    Pt seen in room 3018 after her brief stay in ICU for RVR; cardiologist questions role of sleep disordered breathing, etc.     Hx: from pt who is very pleasant good historian. She has been at current weight (300#) for long time. Smoker lifelong \"until 8d ago\"  No home O2 or PAP  Has been using albuterol MDI at least 4x/day  [facility] 12/2016 edema & cellulitis - since then had FNA of thyroid nodule ((? Thyroid status))  Did not feel palpitations on admission - just SOB - feels much better now  Snores but denies EDS; says she doesn;t sleep much     PEx:  A&A no distress on nc O2  M4 airway   Neck: no palp mass or thyroid nodule  Lungs: trivial few crackles o/w clear left;   * right: decreased bs and definite bronchial bs RLLF   * no wheeze  Cor: irregular   Abd: MO  Extr: trace edema; symmetric; mild cyanotic               ECHO:  + probably evidence of at least some RV insufficiency    Transthoracic Echocardiogram  Height: 64 in  Weight: 299.4 lb  BSA: 2.32 m-sq  BP: 124 / 66 mmHg  Study date: 02-Oct-2017  Status: Routine    Allergies: PEANUT, STING, BEE, CLARITHROMYCIN, PENICILLINS, PREDNISONE  Referring_Ordering Physician: Mitch Blandon MD  Interpreting Physician: Ha Hancock MD  Technologist: Ambrosio Reis. Irina Dolan    SUMMARY:  Procedure information: This was a technically difficult study. Echocardiographic views were limited by poor acoustic window availability. Left ventricle: Systolic function was at the lower limits of normal by visual   assessment. Ejection fraction was  estimated to be 50 %. No obvious wall motion abnormalities identified in the   views obtained. Wall thickness was  increased. Right ventricle: Systolic function was reduced. Not well visualized. Tricuspid valve: There was mild regurgitation. Inferior vena cava, hepatic veins: The inferior vena cava was dilated. Respirophasic changes in dimension were absent.     INDICATIONS: Congestive heart failure. PROCEDURE: This was a routine study. The study included complete 2D imaging,   M-mode, limited spectral Doppler, and color Doppler. Systolic blood pressure was 124 mmHg, at the start of the study. Diastolic blood pressure was 66 mmHg, at the start of the study. Echocardiographic views were limited by poor acoustic window availability. This was a  technically difficult study. LEFT VENTRICLE: Size was normal. Systolic function was at the lower limits of   normal by visual assessment. Ejection fraction was estimated to be 50 %. No obvious wall motion abnormalities identified in the views obtained. Wall thickness was increased. DOPPLER: The study was not technically sufficient to allow evaluation of LV diastolic function. RIGHT VENTRICLE: The size was probably normal. Systolic function was reduced. Not well visualized. LEFT ATRIUM: Size was normal.    MITRAL VALVE: There was normal leaflet separation. DOPPLER: The transmitral   velocity was within the normal range. There  was no evidence for stenosis. There was no significant regurgitation. AORTIC VALVE: The valve was probably trileaflet. Leaflets exhibited normal   cuspal separation. DOPPLER: There was no  stenosis. There was no regurgitation. TRICUSPID VALVE: There was normal leaflet separation. DOPPLER: The   transtricuspid velocity was within the normal range. There was no evidence for tricuspid stenosis. There was mild regurgitation. PULMONIC VALVE: Not well visualized. AORTA: The root exhibited normal size. SYSTEMIC VEINS: IVC: The inferior vena cava was dilated. Respirophasic changes in dimension were absent. PERICARDIUM: No significant pericardial effusion identified. IMPRESSION:  # Cardiac   * Admitted with Afib RVR    * may need long term AC; cardiology on case   * RV insufficiency?     # Thyroid nodule by her hx FNA this year benign   * TSH & FT4 normal    # Smoker w/ elevated hemidiaphragm   * possibly significantly affecting her gas exchange (via atectasis of RLL)   * should have a CT so will do a CTA to rule out CTEPH and mass that could be affecting the vagus nerve   * continue duonebs for now; conside just antiocholinergic if Afib remains problematic     # JASSON phenotype    * should have a sleep study   * will get RA-ABG at some point     -steven  [phone]

## 2017-10-02 NOTE — CONSULTS
[facility] - Consult Note    Consultation request by Chris Velásquez MD for advice/opinion related to evaluating Atrial fibrillation [facility])    Date of  Admission: 10/1/2017 10:22 AM   Primary Care Physician:  Corinne Mclaughlin MD    I saw, evaluated, interviewed and examined the patient personally. I agree with the findings and plan of care as documented below with PAHOLLY note  Worsenign SOB over couple weeks  A.fib with RVR  Now back in NSR  Woud start oral meds   ? ? Needs sleep study as outpatient. Likely has OHS and sleep apnea  For now ASA until further data available. Diego Webber MD       Assessment:     -New onset atrial fibrillation, CHADS2 score 1 (+ Diet controlled diabetes per patient, ). Stroke risk about 3%. Off cardizem gtt. Converted to NSR between 4-8pm yesterday. Remains in NSR this morning with some PVCs. -Acute asthma exacerbation, pt reports hx of asthma  -DM, controlled with diet, not on any meds  -Morbid obesity       Plan:     -Would initiate aspirin daily for stroke prophylaxis.   -Will obtain echocardiogram.   -Off cardizem gtt but will leave order in place in case pt goes back into rapid afib and requires gtt. -Pt with conversational dyspnea during my evaluation with frequent de-sats into the 80's. + wheezing on exam. On NC O2. Recommend good pulmonary hygiene. -Good urine output with 1mg IV bumex. Normal BNP. Would continue PO lasix. History of Present Illness: This is a 65 y.o. female admitted for Atrial fibrillation ([facility] [address] Patient complains of:   SOB    Pt is a 62yo  female who reports having SOB over past few weeks which worsened over past two days prompting her to come to the ER. She has a PMHx of asthma, DM, and morbid obesity. Pt uses a walker and wheelchair to get around her home. States she has been using her albuterol inhaler more frequently in past few weeks without resolution of sob.  Denies any other symptoms including chest pain, palpitations, dizziness, or syncope. Pt states she hasn't been able to lie flat and hasn't been sleeping well because of such. Cardiac risk factors:   DM    Review of Symptoms:  Except as stated above include:  Constitutional:  negative  Respiratory:  Positive for shortness of breath  Cardiovascular: positive for orthopnea, negative for chest pains or LE swelling. Gastrointestinal: negative  Genitourinary:  negative  Musculoskeletal:  Negative  Neurological:  Negative  Dermatological:  Negative  Endocrinological: Negative  Psychological:  Negative     Past Medical History:     Past Medical History:   Diagnosis Date    Arthritis     Asthma     Chronic obstructive pulmonary disease ([facility] [address]          Social History:     Social History     Social History    Marital status:      Spouse name: N/A    Number of children: N/A    Years of education: N/A     Social History Main Topics    Smoking status: Passive Smoke Exposure - Never Smoker    Smokeless tobacco: Never Used    Alcohol use No    Drug use: No    Sexual activity: Not Asked     Other Topics Concern    None     Social History Narrative    None        Family History:   History reviewed. No pertinent family history. Medications:      Allergies   Allergen Reactions    Peanut Anaphylaxis    Sting, Bee Anaphylaxis    Biaxin [Clarithromycin] Angioedema    Penicillins Angioedema    Prednisone Angioedema        Current Facility-Administered Medications   Medication Dose Route Frequency    amitriptyline (ELAVIL) tablet 50 mg  50 mg Oral QHS    HYDROcodone-acetaminophen (NORCO) 7.5-325 mg per tablet 1 Tab  1 Tab Oral Q4H    dilTIAZem (CARDIZEM) 100 mg in 0.9% sodium chloride (MBP/ADV) 100 mL infusion  0-15 mg/hr IntraVENous TITRATE    levoFLOXacin (LEVAQUIN) 750 mg in D5W IVPB  750 mg IntraVENous Q24H    acetaminophen (TYLENOL) tablet 650 mg  650 mg Oral Q4H PRN    ondansetron (ZOFRAN) injection 4 mg  4 mg IntraVENous Q4H PRN    heparin (porcine) injection 5,000 Units  5,000 Units SubCUTAneous Q8H    ELECTROLYTE REPLACEMENT PROTOCOL  1 Each Other PRN    ELECTROLYTE REPLACEMENT PROTOCOL  1 Each Other PRN    ELECTROLYTE REPLACEMENT PROTOCOL  1 Each Other PRN    ELECTROLYTE REPLACEMENT PROTOCOL  1 Each Other PRN    furosemide (LASIX) tablet 40 mg  40 mg Oral DAILY    albuterol-ipratropium (DUO-NEB) 2.5 MG-0.5 MG/3 ML  3 mL Nebulization Q6H RT    albuterol (PROVENTIL VENTOLIN) nebulizer solution 2.5 mg  2.5 mg Nebulization Q6H PRN         Physical Exam:     Visit Vitals    /58    Pulse 70    Temp 97.4 °F (36.3 °C)    Resp 15    Ht 5' 4\" (1.626 m)    Wt 135.2 kg (298 lb)    SpO2 93%    BMI 51.15 kg/m2     BP Readings from Last 3 Encounters:   10/02/17 124/58     Pulse Readings from Last 3 Encounters:   10/02/17 70     Wt Readings from Last 3 Encounters:   10/01/17 135.2 kg (298 lb)       General:  Awake, alert, oriented x 3  Neck:  Supple, no jvd  Lungs:  bilat wheezing, On NC 02  Heart:  Reg rate and rhythm, no murmur  Abdomen:  Obese, soft, non-tender  Extremities:  1+ LE edema  Skin: warm and dry  Neuro: no focal deficits  Psych: normal mood and affect     Data Review:     Recent Labs      10/02/17   0405  10/01/17   1045   WBC  10.2  11.7   HGB  13.3  13.6   HCT  42.7  43.6   PLT  239  231     Recent Labs      10/02/17   0405  10/01/17   1045   NA  137  136   K  4.2  4.1   CL  97*  100   CO2  34*  30   GLU  145*  135*   BUN  23*  22*   CREA  0.87  0.94   CA  8.8  9.0   MG  2.2   --    PHOS  4.4   --    ALB   --   3.3*   SGOT   --   14*   ALT   --   14       Results for orders placed or performed during the hospital encounter of 10/01/17   EKG, 12 LEAD, INITIAL   Result Value Ref Range    Ventricular Rate 143 BPM    Atrial Rate 118 BPM    P-R Interval 148 ms    QRS Duration 52 ms    Q-T Interval 274 ms    QTC Calculation (Bezet) 422 ms    Calculated R Axis -149 degrees    Calculated T Axis -16 degrees    Diagnosis Likely A.fib with RVR  Right bundle branch block  Non-specific ST/T wave changes  No previous ECGs available  Confirmed by Monique Alberto ([id number]) on 10/2/2017 8:43:35 AM         All Cardiac Markers in the last 24 hours:    Lab Results   Component Value Date/Time    CPK 31 10/01/2017 10:45 AM    CKMB <1.0 10/01/2017 10:45 AM    CKND1  10/01/2017 10:45 AM     CALCULATION NOT PERFORMED WHEN RESULT IS BELOW LINEAR LIMIT    Mace Filler <0.02 10/01/2017 11:00 PM    TROIQ <0.02 10/01/2017 05:23 PM    TROIQ <0.02 10/01/2017 10:45 AM       Last Lipid:  No results found for: CHOL, CHOLX, CHLST, CHOLV, HDL, LDL, LDLC, DLDLP, TGLX, TRIGL, TRIGP, CHHD, CHHDX    Signed By: WALE Cage     October 2, 2017

## 2017-10-02 NOTE — INTERDISCIPLINARY ROUNDS
ICU AM  (Multidisciplinary/ Interdisciplinary) Rounds   10/2/2017  Observed patient & discussed case w/ WALE STAPLETON, Charge Nurse et al.      * in SDU for Afib RVR; noted to have JASSON phenotype   * has been in SR briefly now in controlled VR Afib  CXR (most recent):   10/1/2017  Chest, single view   Indication: Chest pain, shortness of breath   Comparison: None   Findings:  Portable upright AP view of the chest was obtained. The  cardiomediastinal silhouette is within normal limits. The pulmonary vasculature  is unremarkable. The lungs are underexpanded. Asymmetric elevation right  hemidiaphragm with associated atelectasis and bronchovascular crowding in the  right lung base. No focal consolidation. No pleural effusion nor pneumothorax. No acute osseous abnormality. Degenerative changes in the acromioclavicular  joints bilaterally.   Impression:  No radiographic evidence of an acute abnormality. Elevation right hemidiaphragm  in right basilar atelectasis. Impression:  * Management by Hospitalists & Cardiology  * VTEP: hep 5000 q8    Interventions by ICU Team:    Plan/ Disposition:  * will discuss her sleep issues & CPAP & arrange for FU  * long term AC will be needed ? ?      -steven   pager: [phone]

## 2017-10-02 NOTE — PROGRESS NOTES
1930- assumed care of pt awake and alert sitting up in bed connected to monitor. cardizem gtt check done with off going shift. 2000- assessment completed. O2 NC in use. Pt due to void, sharif removed around 1700. Pt in NSR with BBB and inverted T wave. 2330- pt assisted up to [facility] to void then back to bed.  2202- cardizem gtt decreased to 7.5 mg/hr. 0005- reassessment done. cardizem gtt decreased to 5 mg/hr. 0200- pt sleeping without complaint. HR controlled in the 60-70s. 0400- reassessment done. Pt resting. 0630-lab work noted except ionized calcium. 0730- Bedside and Verbal shift change report given to Paul VELEZ RN (oncoming nurse) by Tata Fischer RN   (offgoing nurse). Report included the following information SBAR, Kardex, Intake/Output, MAR and Recent Results.

## 2017-10-02 NOTE — CDMP QUERY
Please clarify if this patient is being treated/managed for:    =>UTI POA    =>Other Explanation of clinical findings  =>Unable to Determine (no explanation of clinical findings)    The medical record reflects the following:    Risk: 64 yo female w/PMH COPD    Clinical Indicators: Per UA on admission  Color: YELLOW  Appearance: CLEAR  Specific gravity: 1.009  pH (UA): 5.5  Protein: NEGATIVE  Glucose: NEGATIVE  Ketone: NEGATIVE  Blood: NEGATIVE  Bilirubin: NEGATIVE Urobilinogen: 0.2  Nitrites: NEGATIVE Leukocyte Esterase: MODERATE (A) Epithelial cells: 1+ WBC: 36 to 50  RBC: 2 to 4    Bacteria: 2+ (A)    Treatment: IV levaquin

## 2017-10-02 NOTE — PROGRESS NOTES
7295: PT orders received and chart reviewed. PURA Miller cleared patient for PT.  ECHO in room with patient. Will follow up.      Thank you,     Corinne Finley, PT, DPT

## 2017-10-03 ENCOUNTER — APPOINTMENT (OUTPATIENT)
Dept: CT IMAGING | Age: 62
DRG: 193 | End: 2017-10-03
Attending: INTERNAL MEDICINE
Payer: MEDICARE

## 2017-10-03 LAB
ANION GAP SERPL CALC-SCNC: 8 MMOL/L (ref 3–18)
BACTERIA SPEC CULT: ABNORMAL
BUN SERPL-MCNC: 30 MG/DL (ref 7–18)
BUN/CREAT SERPL: 33 (ref 12–20)
CA-I SERPL-SCNC: 1.09 MMOL/L (ref 1.12–1.32)
CALCIUM SERPL-MCNC: 8.9 MG/DL (ref 8.5–10.1)
CHLORIDE SERPL-SCNC: 94 MMOL/L (ref 100–108)
CO2 SERPL-SCNC: 33 MMOL/L (ref 21–32)
CREAT SERPL-MCNC: 0.91 MG/DL (ref 0.6–1.3)
ERYTHROCYTE [DISTWIDTH] IN BLOOD BY AUTOMATED COUNT: 14.3 % (ref 11.6–14.5)
GLUCOSE SERPL-MCNC: 167 MG/DL (ref 74–99)
HCT VFR BLD AUTO: 45.1 % (ref 35–45)
HGB BLD-MCNC: 13.8 G/DL (ref 12–16)
MAGNESIUM SERPL-MCNC: 2.2 MG/DL (ref 1.6–2.6)
MCH RBC QN AUTO: 29.9 PG (ref 24–34)
MCHC RBC AUTO-ENTMCNC: 30.6 G/DL (ref 31–37)
MCV RBC AUTO: 97.8 FL (ref 74–97)
PHOSPHATE SERPL-MCNC: 3.5 MG/DL (ref 2.5–4.9)
PLATELET # BLD AUTO: 281 K/UL (ref 135–420)
PMV BLD AUTO: 10.8 FL (ref 9.2–11.8)
POTASSIUM SERPL-SCNC: 4.7 MMOL/L (ref 3.5–5.5)
RBC # BLD AUTO: 4.61 M/UL (ref 4.2–5.3)
SERVICE CMNT-IMP: ABNORMAL
SODIUM SERPL-SCNC: 135 MMOL/L (ref 136–145)
WBC # BLD AUTO: 9.3 K/UL (ref 4.6–13.2)

## 2017-10-03 PROCEDURE — 80048 BASIC METABOLIC PNL TOTAL CA: CPT | Performed by: HOSPITALIST

## 2017-10-03 PROCEDURE — 74011000250 HC RX REV CODE- 250: Performed by: HOSPITALIST

## 2017-10-03 PROCEDURE — 36415 COLL VENOUS BLD VENIPUNCTURE: CPT | Performed by: HOSPITALIST

## 2017-10-03 PROCEDURE — 74011250637 HC RX REV CODE- 250/637: Performed by: HOSPITALIST

## 2017-10-03 PROCEDURE — 97535 SELF CARE MNGMENT TRAINING: CPT

## 2017-10-03 PROCEDURE — 83735 ASSAY OF MAGNESIUM: CPT | Performed by: HOSPITALIST

## 2017-10-03 PROCEDURE — 74011636320 HC RX REV CODE- 636/320: Performed by: INTERNAL MEDICINE

## 2017-10-03 PROCEDURE — 85027 COMPLETE CBC AUTOMATED: CPT | Performed by: HOSPITALIST

## 2017-10-03 PROCEDURE — 71275 CT ANGIOGRAPHY CHEST: CPT

## 2017-10-03 PROCEDURE — 77010033678 HC OXYGEN DAILY

## 2017-10-03 PROCEDURE — 74011250636 HC RX REV CODE- 250/636: Performed by: HOSPITALIST

## 2017-10-03 PROCEDURE — 65660000000 HC RM CCU STEPDOWN

## 2017-10-03 PROCEDURE — 74011000258 HC RX REV CODE- 258: Performed by: HOSPITALIST

## 2017-10-03 PROCEDURE — 94760 N-INVAS EAR/PLS OXIMETRY 1: CPT

## 2017-10-03 PROCEDURE — 84100 ASSAY OF PHOSPHORUS: CPT | Performed by: HOSPITALIST

## 2017-10-03 PROCEDURE — 74011250637 HC RX REV CODE- 250/637: Performed by: PHYSICIAN ASSISTANT

## 2017-10-03 PROCEDURE — 82330 ASSAY OF CALCIUM: CPT | Performed by: HOSPITALIST

## 2017-10-03 PROCEDURE — 74011250637 HC RX REV CODE- 250/637: Performed by: INTERNAL MEDICINE

## 2017-10-03 PROCEDURE — 97530 THERAPEUTIC ACTIVITIES: CPT

## 2017-10-03 PROCEDURE — 94640 AIRWAY INHALATION TREATMENT: CPT

## 2017-10-03 RX ORDER — DIGOXIN 125 MCG
0.25 TABLET ORAL DAILY
Status: DISCONTINUED | OUTPATIENT
Start: 2017-10-03 | End: 2017-10-04 | Stop reason: HOSPADM

## 2017-10-03 RX ORDER — ASPIRIN 325 MG
325 TABLET ORAL DAILY
Status: DISCONTINUED | OUTPATIENT
Start: 2017-10-04 | End: 2017-10-04 | Stop reason: HOSPADM

## 2017-10-03 RX ADMIN — HYDROCODONE BITARTRATE AND ACETAMINOPHEN 1 TABLET: 7.5; 325 TABLET ORAL at 09:08

## 2017-10-03 RX ADMIN — HYDROCODONE BITARTRATE AND ACETAMINOPHEN 1 TABLET: 7.5; 325 TABLET ORAL at 21:44

## 2017-10-03 RX ADMIN — SODIUM CHLORIDE 10 MG/HR: 900 INJECTION, SOLUTION INTRAVENOUS at 09:10

## 2017-10-03 RX ADMIN — HYDROCODONE BITARTRATE AND ACETAMINOPHEN 1 TABLET: 7.5; 325 TABLET ORAL at 06:21

## 2017-10-03 RX ADMIN — HEPARIN SODIUM 5000 UNITS: 5000 INJECTION, SOLUTION INTRAVENOUS; SUBCUTANEOUS at 18:02

## 2017-10-03 RX ADMIN — DEXAMETHASONE 6 MG: 2 TABLET ORAL at 21:45

## 2017-10-03 RX ADMIN — IOPAMIDOL 70 ML: 755 INJECTION, SOLUTION INTRAVENOUS at 17:41

## 2017-10-03 RX ADMIN — METOPROLOL TARTRATE 12.5 MG: 25 TABLET ORAL at 09:09

## 2017-10-03 RX ADMIN — IPRATROPIUM BROMIDE AND ALBUTEROL SULFATE 3 ML: .5; 3 SOLUTION RESPIRATORY (INHALATION) at 07:30

## 2017-10-03 RX ADMIN — HYDROCODONE BITARTRATE AND ACETAMINOPHEN 1 TABLET: 7.5; 325 TABLET ORAL at 18:01

## 2017-10-03 RX ADMIN — LEVOFLOXACIN 750 MG: 5 INJECTION, SOLUTION INTRAVENOUS at 14:24

## 2017-10-03 RX ADMIN — IPRATROPIUM BROMIDE AND ALBUTEROL SULFATE 3 ML: .5; 3 SOLUTION RESPIRATORY (INHALATION) at 02:00

## 2017-10-03 RX ADMIN — IPRATROPIUM BROMIDE AND ALBUTEROL SULFATE 3 ML: .5; 3 SOLUTION RESPIRATORY (INHALATION) at 19:51

## 2017-10-03 RX ADMIN — DEXAMETHASONE 6 MG: 2 TABLET ORAL at 09:09

## 2017-10-03 RX ADMIN — AMITRIPTYLINE HYDROCHLORIDE 50 MG: 50 TABLET, FILM COATED ORAL at 00:17

## 2017-10-03 RX ADMIN — SODIUM CHLORIDE 10 MG/HR: 900 INJECTION, SOLUTION INTRAVENOUS at 00:53

## 2017-10-03 RX ADMIN — AMITRIPTYLINE HYDROCHLORIDE 50 MG: 50 TABLET, FILM COATED ORAL at 21:44

## 2017-10-03 RX ADMIN — METOPROLOL TARTRATE 12.5 MG: 25 TABLET ORAL at 21:45

## 2017-10-03 RX ADMIN — HEPARIN SODIUM 5000 UNITS: 5000 INJECTION, SOLUTION INTRAVENOUS; SUBCUTANEOUS at 09:07

## 2017-10-03 RX ADMIN — HEPARIN SODIUM 5000 UNITS: 5000 INJECTION, SOLUTION INTRAVENOUS; SUBCUTANEOUS at 00:17

## 2017-10-03 RX ADMIN — ASPIRIN 162 MG: 81 TABLET, COATED ORAL at 09:08

## 2017-10-03 RX ADMIN — FUROSEMIDE 40 MG: 40 TABLET ORAL at 09:07

## 2017-10-03 RX ADMIN — HYDROCODONE BITARTRATE AND ACETAMINOPHEN 1 TABLET: 7.5; 325 TABLET ORAL at 00:17

## 2017-10-03 NOTE — ROUTINE PROCESS
Bedside and Verbal shift change report given to Alexander Orona RN (oncoming nurse) by Dillon Ross RN (offgoing nurse). Report included the following information SBAR, Intake/Output, Recent Results and Med Rec Status . 2015 - Shift assessment performed. Pt AOx4. She appears in good spirits. 2100 - Pt having severe coughing spells, that take her breath away. She becomes discolored in te face, and is not able to breath. Her sats drop to low 80's and HR shoots up to 150.    2200 - Patient having busrts of A-fib. 2230 - Pt sustaining A-fib. Dr Jackie Odonnell paged. 2245 - Dr Jackie Odonnell called and ordered a continuous infusion of cardizem at 10 mg/hr. 2300 - Order placed. 2330 - Patient converted into NSR.    0053 - Pt has been sustaining A-fib again for longer than 30 minutes, cardizem started. 0200 - Patient not responding to cardizem. HR very volatile, ranging between 60 and 170, constantly. 0400 - Paged Dr Jackie Odonnell about cardizem not stabilizing pt's heart. He asked about increasing the dose, nurse advised against, since pt's BP is running on the low side. Nurse suggested digoxin, per ICU nurse reccommendation. Dr Jackie Odonnell stated that he would not prescribe digoxin without consulting cardiology. He also stated that he could not transfer pt to ICU because of no available beds. 0500 - Patient stable, HR in the 90's. Bedside and Verbal shift change report given to Tristin Diop RN (oncoming nurse) by Brayden Ramos (offgoing nurse). Report included the following information SBAR, Recent Results and Med Rec Status.

## 2017-10-03 NOTE — PROGRESS NOTES
PCCM    VSS Afeb    CTA no PE or mass; RML & RLL consolidation and elevated RHD.  Continue abx, BD & airway clearance.     -steven   [phone]

## 2017-10-03 NOTE — PROGRESS NOTES
1945 Bedside and verbal shift change report given by Brian Max RN (offgoing nurse). Report included the following information SBAR, Lab results, flowsheet, MAR and telemetry. 1955 Shift assessment completed. All meds give as ordered (see MAR and Flowsheet)    2146 Patient c/o pain and PRN medication give. (See flowsheet and MAR)    2210 Reassessed pain level. Patient denies pain at the  Moment. Resting in bed watching TV.    2330 Patient suffered from a long coughing spell, Pt verbalized feeling secretion attempting to come out but getting stuck on the back of the throat. 3264 Reassessment completed. Pt denies pian or discomfort. Continues to rest in bed.     0415 Reassessment completed. Pt denies pian or discomfort. Continues to rest in bed.     0710  Bedside and verbal shift change report given by Isra Bermudez RN (offgoing nurse). Report included the following information SBAR, Lab results, flowsheet, MAR and telemetry.

## 2017-10-03 NOTE — PROGRESS NOTES
Hospitalist Progress Note  Esau Juarez MD  Internal medicine/ Hospitalist    Daily Progress Note: 10/3/2017 1:07 PM      Interval history / Subjective:   Admitted for Afib with rvr,copd exacerbation after she presented to ER because of short of breath. Patient is on cardizem drip,iv steroids,nebulizer treatment. Cardiology consulting. Her heart rate was high last night,so cardizem which was d/marion,was re-started. Current Facility-Administered Medications   Medication Dose Route Frequency    digoxin (LANOXIN) tablet 0.25 mg  0.25 mg Oral DAILY    [START ON 10/4/2017] aspirin (ASPIRIN) tablet 325 mg  325 mg Oral DAILY    metoprolol tartrate (LOPRESSOR) tablet 12.5 mg  12.5 mg Oral Q12H    dexamethasone (DECADRON) tablet 6 mg  6 mg Oral Q12H    dilTIAZem (CARDIZEM) 100 mg in 0.9% sodium chloride (MBP/ADV) 100 mL infusion  5 mg/hr IntraVENous CONTINUOUS    amitriptyline (ELAVIL) tablet 50 mg  50 mg Oral QHS    HYDROcodone-acetaminophen (NORCO) 7.5-325 mg per tablet 1 Tab  1 Tab Oral Q4H    levoFLOXacin (LEVAQUIN) 750 mg in D5W IVPB  750 mg IntraVENous Q24H    acetaminophen (TYLENOL) tablet 650 mg  650 mg Oral Q4H PRN    ondansetron (ZOFRAN) injection 4 mg  4 mg IntraVENous Q4H PRN    heparin (porcine) injection 5,000 Units  5,000 Units SubCUTAneous Q8H    ELECTROLYTE REPLACEMENT PROTOCOL  1 Each Other PRN    ELECTROLYTE REPLACEMENT PROTOCOL  1 Each Other PRN    ELECTROLYTE REPLACEMENT PROTOCOL  1 Each Other PRN    ELECTROLYTE REPLACEMENT PROTOCOL  1 Each Other PRN    furosemide (LASIX) tablet 40 mg  40 mg Oral DAILY    albuterol-ipratropium (DUO-NEB) 2.5 MG-0.5 MG/3 ML  3 mL Nebulization Q6H RT    albuterol (PROVENTIL VENTOLIN) nebulizer solution 2.5 mg  2.5 mg Nebulization Q6H PRN        Review of Systems  Says that still short of breath. Her heart rate was high last night.     Objective:     Visit Vitals    /68    Pulse (!) 108    Temp 97.7 °F (36.5 °C)    Resp 20    Ht 5' 4\" (1.626 m)    Wt 135.2 kg (298 lb)    SpO2 90%    BMI 51.15 kg/m2    O2 Flow Rate (L/min): 4 l/min O2 Device: Nasal cannula    Temp (24hrs), Av °F (36.7 °C), Min:97.7 °F (36.5 °C), Max:98.2 °F (36.8 °C)      10/03 0701 - 10/03 1900  In: 360 [P.O.:360]  Out: 641 [ZYHKY:593]  10/01 1901 - 10/03 0700  In: 536.4 [P.O.:240; I.V.:296.4]  Out: 1100 [Urine:1100]   P/E  General appearance: alert, cooperative, no distress, appears stated age  Head: Normocephalic, without obvious abnormality, atraumatic  Neck: supple, trachea midline  Lungs: clear bilaterally. Heart: tachycardiac,irreg irreg  Abdomen: soft, non-tender.  Bowel sounds normal. No masses,  no organomegaly  Extremities: extremities normal, atraumatic, no cyanosis or edema  Skin: Skin color, texture, turgor normal. No rashes or lesions  Neurologic: Grossly normal    Data Review    Recent Results (from the past 12 hour(s))   METABOLIC PANEL, BASIC    Collection Time: 10/03/17  5:42 AM   Result Value Ref Range    Sodium 135 (L) 136 - 145 mmol/L    Potassium 4.7 3.5 - 5.5 mmol/L    Chloride 94 (L) 100 - 108 mmol/L    CO2 33 (H) 21 - 32 mmol/L    Anion gap 8 3.0 - 18 mmol/L    Glucose 167 (H) 74 - 99 mg/dL    BUN 30 (H) 7.0 - 18 MG/DL    Creatinine 0.91 0.6 - 1.3 MG/DL    BUN/Creatinine ratio 33 (H) 12 - 20      GFR est AA >60 >60 ml/min/1.73m2    GFR est non-AA >60 >60 ml/min/1.73m2    Calcium 8.9 8.5 - 10.1 MG/DL   CBC W/O DIFF    Collection Time: 10/03/17  5:42 AM   Result Value Ref Range    WBC 9.3 4.6 - 13.2 K/uL    RBC 4.61 4.20 - 5.30 M/uL    HGB 13.8 12.0 - 16.0 g/dL    HCT 45.1 (H) 35.0 - 45.0 %    MCV 97.8 (H) 74.0 - 97.0 FL    MCH 29.9 24.0 - 34.0 PG    MCHC 30.6 (L) 31.0 - 37.0 g/dL    RDW 14.3 11.6 - 14.5 %    PLATELET 374 404 - 155 K/uL    MPV 10.8 9.2 - 11.8 FL   MAGNESIUM    Collection Time: 10/03/17  5:42 AM   Result Value Ref Range    Magnesium 2.2 1.6 - 2.6 mg/dL   PHOSPHORUS    Collection Time: 10/03/17  5:42 AM   Result Value Ref Range    Phosphorus 3.5 2.5 - 4.9 MG/DL   CALCIUM, IONIZED    Collection Time: 10/03/17  5:42 AM   Result Value Ref Range    Ionized Calcium 1.09 (L) 1.12 - 1.32 MMOL/L         Assessment/Plan:     Principal Problem:    Atrial fibrillation with RVR (HCC) (10/1/2017)    Active Problems:    COPD exacerbation ([address] (10/1/2017)      Atrial fibrillation (HCC) (10/1/2017)      Care Plan   1)  Afib RVR                        -Heart rate still high. Pt restarted on cardizem drip last night.                        -Cardiology consulted. Digoxin daily added.                        - On oral metoprolol 12.5 mg po bid                        - Chads score 1. On asa                        -ECHO with EF 50 %.  No obvious wall motion abnormalities identified in the views obtained.      2)  Copd exacerbation                        -still requiring oxygen at 4 liters and desat with ambulation.                        -on atbx                        -on dexamethasone- states pred allergy is mood change, duoneb, levaquin      3)  Chronic knee pain/arthritis                        - pain meds prn      4)  Mild peripheral edema                        - echo, lasix 40 mg oral daily    DVT prophylaxis:scds  Full code  Disposition:d

## 2017-10-03 NOTE — PROGRESS NOTES
Patient received from ICU via wheelchair transport, Patient is noted to be an obese woman with excessive SOB upon rest and exertion, while ambulating to the bathroom she becomes almost panicked about her lack of oxygen. She was coached to slow her breathing down. She is noted with increased pain during ambulation and has a difficult time ambulating. She has been educated on smoking cessation as well diet and exercise. Patient is noted on 4 L of oxygen via nasal cannula, noted with crackles and wheezing lung sounds. Patient has a history of COPD, and asthma she states she does not use oxygen at home and she uses a walker to ambulate at home. Patient is currently in bed with HOB elevated at 90*, with zero c/o pain. Bedside and Verbal shift change report given to [address] [place] [address]  (oncoming nurse) by Baljit Benjamin RN (offgoing nurse). Report included the following informatiNSR, Afib, BBB, 1rst degree AVB, with frequent PVC'sSBAR, Procedure Summary, Intake/Output, MAR, Recent Results, Med Rec Status and Cardiac Rhythm NSR, Afib, BBB, 1rst degree AVB, with frequent PVC's.

## 2017-10-03 NOTE — PROGRESS NOTES
[facility]  -  Progress Note      Patient: Jam Weston MRN: [id number]  SSN: [id number]    YOB: 1955  Age: 65 y.o. Sex: female      Admit Date: 10/1/2017      I saw, evaluated, interviewed and examined the patient personally. I agree with the findings and plan of care as documented below with PA-C note  Paroxysmal a.fib, back and forth in a.fib  BB and digoxin for rate control  Discussed about a.fib and stroke prophylaxis. ASA vs. Anticoagulation ( with coumadin / newer agent ). RIsk, benefit, side effects of medications and alternatives were discussed with patient regarding each medications. Patient preffered ASA only at this time. Ya Tong MD          Hospital Problem List:     -New onset atrial fibrillation, CHADS2 score 1 (+ Diet controlled diabetes per patient, ). Stroke risk about 3%. Off cardizem gtt. Converted to NSR between 4-8pm yesterday. Remains in NSR this morning with some PVCs. -Echo with normal LV function 10/2/17, +LVH, RV function decreased.  -Acute respiratory distress with elevated arthur diaphragm on CXR. Pulmonary on board and pt to have CTA chest to rule out mass/obstruction. -JASSON phenotype, would benefit from sleep study in the future.   -DM, controlled with diet, not on any meds  -Morbid obesity    Plan:     -Back in afib with rvr and on cardizem gtt at 10mg/hr. BP borderline low. Decrease cardizem gtt to 5mg/hr. HR in low 115's acceptable given low O2 sats. Will add digoxin. Subjective:     Pt c/o sleepiness/grogginess. Intermittently short of breath.    Remains on 4-5L NC O2.     Objective:      Patient Vitals for the past 8 hrs:   Temp Pulse Resp BP SpO2   10/03/17 0909 - (!) 108 - 111/68 -   10/03/17 0731 - - - - 90 %   10/03/17 0527 97.7 °F (36.5 °C) (!) 103 20 117/68 90 %   10/03/17 0230 98 °F (36.7 °C) (!) 118 20 99/75 97 %         Patient Vitals for the past 96 hrs:   Weight   10/01/17 1049 135.2 kg (298 lb) Intake/Output Summary (Last 24 hours) at 10/03/17 0955  Last data filed at 10/03/17 0659   Gross per 24 hour   Intake              211 ml   Output              925 ml   Net             -714 ml       Physical Exam:  General:  Groggy appearing, states she is \"sleepy\", alert and oriented x 3  Neck:  Supple, no jvd  Lungs:  Clear to auscultation bilaterally on room air  Heart:  Irregularly irregular, tachy, no murmur  Abdomen:  Obese, soft, non-tender  Extremities:  No LE edema, atraumatic    Data Review:     Labs: Results:       Chemistry Recent Labs      10/03/17   0542  10/02/17   0405  10/01/17   1045   GLU  167*  145*  135*   NA  135*  137  136   K  4.7  4.2  4.1   CL  94*  97*  100   CO2  33*  34*  30   BUN  30*  23*  22*   CREA  0.91  0.87  0.94   CA  8.9  8.8  9.0   MG  2.2  2.2   --    PHOS  3.5  4.4   --    AGAP  8  6  6   BUCR  33*  26*  23*   AP   --    --   170*   TP   --    --   7.2   ALB   --    --   3.3*   GLOB   --    --   3.9   AGRAT   --    --   0.8      CBC w/Diff Recent Labs      10/03/17   0542  10/02/17   0405  10/01/17   1045   WBC  9.3  10.2  11.7   RBC  4.61  4.39  4.49   HGB  13.8  13.3  13.6   HCT  45.1*  42.7  43.6   PLT  281  239  231   GRANS   --    --   75*   LYMPH   --    --   15*   EOS   --    --   1      Cardiac Enzymes No results found for: CPK, CK, CKMMB, CKMB, RCK3, CKMBT, CKNDX, CKND1, DOMINIK, TROPT, TROIQ, EVA, TROPT, TNIPOC, BNP, BNPP   Coagulation No results for input(s): PTP, INR, APTT in the last 72 hours.     No lab exists for component: INREXT    Lipid Panel No results found for: CHOL, CHOLPOCT, CHOLX, CHLST, CHOLV, 974313, HDL, LDL, LDLC, DLDLP, 714548, VLDLC, VLDL, TGLX, TRIGL, TRIGP, TGLPOCT, CHHD, CHHDX   BNP No results found for: BNP, BNPP, XBNPT   Liver Enzymes Recent Labs      10/01/17   1045   TP  7.2   ALB  3.3*   AP  170*   SGOT  14*      Digoxin    Thyroid Studies Lab Results   Component Value Date/Time    TSH 0.62 10/01/2017 10:45 AM            Tim Drummond, mD

## 2017-10-03 NOTE — PROGRESS NOTES
Problem: Self Care Deficits Care Plan (Adult)  Goal: *Acute Goals and Plan of Care (Insert Text)  Occupational Therapy Goals  Initiated 10/2/2017 within 7 day(s). 1. Patient will perform grooming tasks at EOB with supervision for balance. 2. Patient will perform lower body dressing with supervision and AE, prn.  3. Patient will perform functional task in standing with support for 8 minutes with <2 rest breaks o increase activity tolerance for ADLs. 4. Patient will perform toilet transfers with supervision/set-up. 5. Patient will perform all aspects of toileting with supervision/set-up. 6. Patient will participate in upper extremity therapeutic exercise/activities with supervision/set-up for 8 minutes to increase BUE strength for ADLs. 7. Patient will utilize energy conservation techniques during functional activities with minimal verbal cues. Outcome: Progressing Towards Goal  OCCUPATIONAL THERAPY TREATMENT     Patient: William Kim (35 y.o. female)  Date: 10/3/2017  Diagnosis: Atrial fibrillation [facility]) Atrial fibrillation with RVR [facility])       Precautions: Fall  Chart, occupational therapy assessment, plan of care, and goals were reviewed. ASSESSMENT:  Pt is pleasant and cooperative, motivated for OOB. Pt requires increase and vc's for pacing w/bed mobility to EOB. Verbal cues for pacing w/transitions and hand placement w/functional transfers. Increase time for functional mobility to bathroom and Min Assist for O2 tubing mgt. Increase time for toileting ADL and pt demonstrates energy conservation technique w/hand hygiene. Pt desats w/minimal exertion 87% on 6L O2 requiring frequent rest breaks and vc's for PLB.  HR 60s throughout session, no c/o pain  EDUCATION Pt educated on safety w/RW and functional mobility and PLB  Progression toward goals:  [X]          Improving appropriately and progressing toward goals  [ ]          Improving slowly and progressing toward goals  [ ]          Not making progress toward goals and plan of care will be adjusted       PLAN:  Patient continues to benefit from skilled intervention to address the above impairments. Continue treatment per established plan of care. Discharge Recommendations:  Home Health vs [facility]  Further Equipment Recommendations for Discharge:  Bariatric shower chair and bariatric rolling walker       SUBJECTIVE:   Patient stated I can't lay back; I have to stay at least ninety degrees.       OBJECTIVE DATA SUMMARY:         Cognitive/Behavioral Status:  Neurologic State: Alert  Orientation Level: Oriented X4  Cognition: Follows commands, Appropriate for age attention/concentration  Safety/Judgement: Decreased awareness of need for safety, Fall prevention  Functional Mobility and Transfers for ADLs:              Bed Mobility:   Supine to Sit: Stand-by asssistance (w/HOB raised and SRs)              Transfers:  Sit to Stand: Contact guard assistance (w/RW)              Toilet Transfer : Stand-by asssistance (w/grab bar)              Bathroom Mobility: Contact guard assistance (w/RW)              Balance:  Sitting: Impaired  Sitting - Static: Good (unsupported)  Sitting - Dynamic: Fair (occasional)  Standing: Impaired; With support  Standing - Static: Fair  Standing - Dynamic : Fair  ADL Intervention:  Grooming  Washing Hands: Stand-by assistance     Toileting  Toileting Assistance: Stand-by assistance     Pain  Pre Treatment:0  Post Treatment:0     Activity Tolerance:    Fair, 2/2 pt fatigues easily     Please refer to the flowsheet for vital signs taken during this treatment.   After treatment:   [ ]  Patient left in no apparent distress sitting up in chair  [X]  Patient left in no apparent distress seated EOB  [X]  Call bell left within reach  [ ]  Nursing notified  [ ]  Caregiver present  [ ]  Bed alarm activated     DIMPLE Mahoney  Time Calculation: 27 mins

## 2017-10-03 NOTE — CDMP QUERY
Please clarify if this patient is being treated/managed for:    =>UTI POA    =>Other Explanation of clinical findings  =>Unable to Determine (no explanation of clinical findings)    The medical record reflects the following:    Risk: 64 yo female w/PMH COPD    Clinical Indicators: Per UA on admission  Color: YELLOW  Appearance: CLEAR  Specific gravity: 1.009  pH (UA): 5.5  Protein: NEGATIVE  Glucose: NEGATIVE  Ketone: NEGATIVE  Blood: NEGATIVE  Bilirubin: NEGATIVE Urobilinogen: 0.2  Nitrites: NEGATIVE Leukocyte Esterase: MODERATE (A) Epithelial cells: 1+ WBC: 36 to 50  RBC: 2 to 4    Bacteria: 2+ (A)    Treatment: IV levaquin    Please clarify and document your clinical opinion in the progress notes and discharge summary including the definitive and/or presumptive diagnosis, (suspected or probable), related to the above clinical findings. Please include clinical findings supporting your diagnosis. If you DECLINE this query or would like to communicate with [facility], please utilize the \"[facility] message box\" at the TOP of the Progress Note on the right.       Thank you,  Celestina Lord RN [facility] [phone]

## 2017-10-03 NOTE — PROGRESS NOTES
Problem: Falls - Risk of  Goal: *Absence of Falls  Document Mary Fall Risk and appropriate interventions in the flowsheet.    Outcome: Progressing Towards Goal  Fall Risk Interventions:  Mobility Interventions: Patient to call before getting OOB           Medication Interventions: Patient to call before getting OOB     Elimination Interventions: Call light in reach     History of Falls Interventions: Consult care management for discharge planning

## 2017-10-03 NOTE — PROGRESS NOTES
730 Report received from [facility]. Patient is received in bed sitting up in a 90* angle. patient is noted on a Cardizem drip, she is noted with uncontrolled a-fib with a BBB in the 120's, she currently shows zero s/s of distress and c/o zero pain. She is continent of bowel and bladder and is ambulatory to the toilet noted with an unsteady gait, she has been educated to call for assistance. Patient was on a Cardizem drip and was noted in NSR with BBB, at 52, MD Deanna Drummond called to discontinue this order. Digoxin and 12 pm Norco held related to decreased pulse rate. Patient recovered well. Pulse has been running high 60's. Patient has been noted with increase SOB, she is on 4 L oxygen via nasal cannula and tolerates well, until she attempts to ambulate. Patient was sent down for a CT Scan of the chest today and recovered well once she was in bed. Patient was educated to slow her breathing down and take slow deep breaths through her nose. Patient is currently in bed watching tv with zero c/o pain and zero s/s of distress noted. Bedside and Verbal shift change report given to Naomie Angel (oncoming nurse) by Ale Johnson RN (offgoing nurse). Report included the following informatiSinus tyler with BBBSBAR, Procedure Summary, Intake/Output, MAR and Cardiac Rhythm Sinus tyler with BBB.

## 2017-10-03 NOTE — PROGRESS NOTES
Chart accessed for MEWS score of 4. Patient with elevated HR, AFIB, uncontrolled. MD aware. Cardizem gtt initiated by beside RN, Wilner Ordonez. Will continue to monitor.

## 2017-10-04 ENCOUNTER — HOME HEALTH ADMISSION (OUTPATIENT)
Dept: HOME HEALTH SERVICES | Facility: HOME HEALTH | Age: 62
End: 2017-10-04
Payer: MEDICARE

## 2017-10-04 VITALS
HEART RATE: 117 BPM | DIASTOLIC BLOOD PRESSURE: 71 MMHG | SYSTOLIC BLOOD PRESSURE: 110 MMHG | RESPIRATION RATE: 16 BRPM | BODY MASS INDEX: 50.02 KG/M2 | WEIGHT: 293 LBS | HEIGHT: 64 IN | OXYGEN SATURATION: 95 % | TEMPERATURE: 98.7 F

## 2017-10-04 LAB
ANION GAP SERPL CALC-SCNC: 2 MMOL/L (ref 3–18)
BASOPHILS # BLD: 0 K/UL (ref 0–0.06)
BASOPHILS NFR BLD: 0 % (ref 0–2)
BUN SERPL-MCNC: 35 MG/DL (ref 7–18)
BUN/CREAT SERPL: 45 (ref 12–20)
CA-I SERPL-SCNC: 1.15 MMOL/L (ref 1.12–1.32)
CALCIUM SERPL-MCNC: 9 MG/DL (ref 8.5–10.1)
CHLORIDE SERPL-SCNC: 95 MMOL/L (ref 100–108)
CO2 SERPL-SCNC: 37 MMOL/L (ref 21–32)
CREAT SERPL-MCNC: 0.77 MG/DL (ref 0.6–1.3)
DIFFERENTIAL METHOD BLD: ABNORMAL
EOSINOPHIL # BLD: 0 K/UL (ref 0–0.4)
EOSINOPHIL NFR BLD: 0 % (ref 0–5)
ERYTHROCYTE [DISTWIDTH] IN BLOOD BY AUTOMATED COUNT: 14.3 % (ref 11.6–14.5)
GLUCOSE SERPL-MCNC: 184 MG/DL (ref 74–99)
HCT VFR BLD AUTO: 42.6 % (ref 35–45)
HGB BLD-MCNC: 13.3 G/DL (ref 12–16)
LYMPHOCYTES # BLD: 0.6 K/UL (ref 0.9–3.6)
LYMPHOCYTES NFR BLD: 6 % (ref 21–52)
MAGNESIUM SERPL-MCNC: 2.3 MG/DL (ref 1.6–2.6)
MCH RBC QN AUTO: 30 PG (ref 24–34)
MCHC RBC AUTO-ENTMCNC: 31.2 G/DL (ref 31–37)
MCV RBC AUTO: 96.2 FL (ref 74–97)
MONOCYTES # BLD: 0.5 K/UL (ref 0.05–1.2)
MONOCYTES NFR BLD: 5 % (ref 3–10)
NEUTS SEG # BLD: 9 K/UL (ref 1.8–8)
NEUTS SEG NFR BLD: 89 % (ref 40–73)
PHOSPHATE SERPL-MCNC: 3.4 MG/DL (ref 2.5–4.9)
PLATELET # BLD AUTO: 287 K/UL (ref 135–420)
PMV BLD AUTO: 10.5 FL (ref 9.2–11.8)
POTASSIUM SERPL-SCNC: 4.3 MMOL/L (ref 3.5–5.5)
RBC # BLD AUTO: 4.43 M/UL (ref 4.2–5.3)
SODIUM SERPL-SCNC: 134 MMOL/L (ref 136–145)
WBC # BLD AUTO: 10.1 K/UL (ref 4.6–13.2)

## 2017-10-04 PROCEDURE — 74011250637 HC RX REV CODE- 250/637: Performed by: PHYSICIAN ASSISTANT

## 2017-10-04 PROCEDURE — 74011250637 HC RX REV CODE- 250/637: Performed by: INTERNAL MEDICINE

## 2017-10-04 PROCEDURE — 80048 BASIC METABOLIC PNL TOTAL CA: CPT | Performed by: INTERNAL MEDICINE

## 2017-10-04 PROCEDURE — 82330 ASSAY OF CALCIUM: CPT | Performed by: HOSPITALIST

## 2017-10-04 PROCEDURE — 74011250637 HC RX REV CODE- 250/637: Performed by: HOSPITALIST

## 2017-10-04 PROCEDURE — 77010033678 HC OXYGEN DAILY

## 2017-10-04 PROCEDURE — 84100 ASSAY OF PHOSPHORUS: CPT | Performed by: INTERNAL MEDICINE

## 2017-10-04 PROCEDURE — 36415 COLL VENOUS BLD VENIPUNCTURE: CPT | Performed by: INTERNAL MEDICINE

## 2017-10-04 PROCEDURE — 83735 ASSAY OF MAGNESIUM: CPT | Performed by: INTERNAL MEDICINE

## 2017-10-04 PROCEDURE — 74011000250 HC RX REV CODE- 250: Performed by: HOSPITALIST

## 2017-10-04 PROCEDURE — 74011250636 HC RX REV CODE- 250/636: Performed by: HOSPITALIST

## 2017-10-04 PROCEDURE — 94640 AIRWAY INHALATION TREATMENT: CPT

## 2017-10-04 PROCEDURE — 97535 SELF CARE MNGMENT TRAINING: CPT

## 2017-10-04 PROCEDURE — 97530 THERAPEUTIC ACTIVITIES: CPT

## 2017-10-04 PROCEDURE — 85025 COMPLETE CBC W/AUTO DIFF WBC: CPT | Performed by: INTERNAL MEDICINE

## 2017-10-04 RX ORDER — METOPROLOL TARTRATE 25 MG/1
12.5 TABLET, FILM COATED ORAL EVERY 12 HOURS
Qty: 60 TAB | Refills: 0 | Status: SHIPPED | OUTPATIENT
Start: 2017-10-04

## 2017-10-04 RX ORDER — LEVOFLOXACIN 750 MG/1
750 TABLET ORAL EVERY 24 HOURS
Status: DISCONTINUED | OUTPATIENT
Start: 2017-10-04 | End: 2017-10-04 | Stop reason: HOSPADM

## 2017-10-04 RX ORDER — DIGOXIN 250 MCG
0.25 TABLET ORAL DAILY
Qty: 30 TAB | Refills: 0 | Status: SHIPPED | OUTPATIENT
Start: 2017-10-04 | End: 2018-01-27

## 2017-10-04 RX ORDER — ASPIRIN 325 MG
325 TABLET ORAL DAILY
Qty: 100 TAB | Refills: 0 | Status: SHIPPED | OUTPATIENT
Start: 2017-10-04 | End: 2018-01-27

## 2017-10-04 RX ORDER — IPRATROPIUM BROMIDE AND ALBUTEROL SULFATE 2.5; .5 MG/3ML; MG/3ML
3 SOLUTION RESPIRATORY (INHALATION)
Qty: 30 NEBULE | Refills: 0 | Status: SHIPPED | OUTPATIENT
Start: 2017-10-04

## 2017-10-04 RX ORDER — DEXAMETHASONE 2 MG/1
TABLET ORAL
Qty: 18 TAB | Refills: 0 | Status: SHIPPED | OUTPATIENT
Start: 2017-10-04 | End: 2018-01-27

## 2017-10-04 RX ORDER — FUROSEMIDE 40 MG/1
TABLET ORAL
Qty: 30 TAB | Refills: 0 | Status: SHIPPED | OUTPATIENT
Start: 2017-10-04

## 2017-10-04 RX ORDER — LEVOFLOXACIN 750 MG/1
750 TABLET ORAL DAILY
Qty: 7 TAB | Refills: 0 | Status: SHIPPED | OUTPATIENT
Start: 2017-10-04 | End: 2018-01-27

## 2017-10-04 RX ADMIN — HEPARIN SODIUM 5000 UNITS: 5000 INJECTION, SOLUTION INTRAVENOUS; SUBCUTANEOUS at 01:16

## 2017-10-04 RX ADMIN — DIGOXIN 0.25 MG: 125 TABLET ORAL at 09:43

## 2017-10-04 RX ADMIN — HEPARIN SODIUM 5000 UNITS: 5000 INJECTION, SOLUTION INTRAVENOUS; SUBCUTANEOUS at 16:40

## 2017-10-04 RX ADMIN — LEVOFLOXACIN 750 MG: 750 TABLET, FILM COATED ORAL at 14:50

## 2017-10-04 RX ADMIN — HYDROCODONE BITARTRATE AND ACETAMINOPHEN 1 TABLET: 7.5; 325 TABLET ORAL at 01:17

## 2017-10-04 RX ADMIN — HYDROCODONE BITARTRATE AND ACETAMINOPHEN 1 TABLET: 7.5; 325 TABLET ORAL at 09:36

## 2017-10-04 RX ADMIN — ASPIRIN 325 MG ORAL TABLET 325 MG: 325 PILL ORAL at 09:36

## 2017-10-04 RX ADMIN — METOPROLOL TARTRATE 12.5 MG: 25 TABLET ORAL at 09:42

## 2017-10-04 RX ADMIN — IPRATROPIUM BROMIDE AND ALBUTEROL SULFATE 3 ML: .5; 3 SOLUTION RESPIRATORY (INHALATION) at 02:08

## 2017-10-04 RX ADMIN — HEPARIN SODIUM 5000 UNITS: 5000 INJECTION, SOLUTION INTRAVENOUS; SUBCUTANEOUS at 09:43

## 2017-10-04 RX ADMIN — HYDROCODONE BITARTRATE AND ACETAMINOPHEN 1 TABLET: 7.5; 325 TABLET ORAL at 13:14

## 2017-10-04 RX ADMIN — HYDROCODONE BITARTRATE AND ACETAMINOPHEN 1 TABLET: 7.5; 325 TABLET ORAL at 16:40

## 2017-10-04 RX ADMIN — IPRATROPIUM BROMIDE AND ALBUTEROL SULFATE 3 ML: .5; 3 SOLUTION RESPIRATORY (INHALATION) at 07:54

## 2017-10-04 RX ADMIN — FUROSEMIDE 40 MG: 40 TABLET ORAL at 09:43

## 2017-10-04 RX ADMIN — HYDROCODONE BITARTRATE AND ACETAMINOPHEN 1 TABLET: 7.5; 325 TABLET ORAL at 05:16

## 2017-10-04 RX ADMIN — DEXAMETHASONE 6 MG: 2 TABLET ORAL at 09:37

## 2017-10-04 NOTE — PROGRESS NOTES
conducted a Follow up consultation and Spiritual Assessment for [facility], who is a 65 y.o.,female. The  provided the following Interventions:  Continued the relationship of care and support. Listened empathically. Offered prayer and assurance of continued prayer on patients behalf. Chart reviewed. The following outcomes were achieved:  Patient expressed gratitude for 's visit. Assessment:  There are no spiritual or Yarsanism issues which require intervention at this time. Plan:  Chaplains will continue to follow and will provide pastoral care on an as needed/requested basis.  recommends bedside caregivers page  on duty if patient shows signs of acute spiritual or emotional distress. Russell Davey M.Div.   [facility]  [phone]

## 2017-10-04 NOTE — PROGRESS NOTES
[facility] amazing[employer]unes informed me that this patients' heart rate went very low and then went to the 140's. [facility] ask me to tell Delgado Looney RN this patients' nurse to check on the patient. I informed Opal NEVES while I was on the phone with [phone].

## 2017-10-04 NOTE — PROGRESS NOTES
Problem: Falls - Risk of  Goal: *Absence of Falls  Document Mary Fall Risk and appropriate interventions in the flowsheet.    Outcome: Progressing Towards Goal  Fall Risk Interventions:  Mobility Interventions: Patient to call before getting OOB           Medication Interventions: Patient to call before getting OOB     Elimination Interventions: Call light in reach     History of Falls Interventions: Door open when patient unattended

## 2017-10-04 NOTE — ANCILLARY DISCHARGE INSTRUCTIONS
Patient and/or next of kin has been given the [facility] Important Message From Medicare About Your Rights\" letter and all questions were answered. Paper copy signed.

## 2017-10-04 NOTE — PROGRESS NOTES
PCCM   10/4/2017    VSS afeb; feels about at her baseline per hospitalist and is ready for discharge. Please see my note of 10/2/2017. The CTA  showed no PE or mass, but there is RML & RLL consolidation and elevated RHD. Continue course of abx, BD & airway clearance. F/U with Dr Wilfrido Wilson in [facility] in 6 weeks. I spoke with his office they will arrange appt.      Discussed with Dr Nurys Brown.     -[facility]   [phone]

## 2017-10-04 NOTE — PROGRESS NOTES
Problem: Falls - Risk of  Goal: *Absence of Falls  Document Mary Fall Risk and appropriate interventions in the flowsheet.    Outcome: Progressing Towards Goal  Fall Risk Interventions:  Mobility Interventions: Patient to call before getting OOB           Medication Interventions: Patient to call before getting OOB     Elimination Interventions: Call light in reach, Patient to call for help with toileting needs     History of Falls Interventions: Door open when patient unattended

## 2017-10-04 NOTE — ROUTINE PROCESS
0730  Bedside and Verbal shift change report given to Candis Wade (oncoming nurse) by Darnell Marie (offgoing nurse). Report included the following information SBAR, Kardex, Intake/Output and MAR.     0920  Shift assessment complete and due meds given. Pt has no complaints at this time    1300  Pt d/c'd at this time. Waiting on o2 delivery to home    1600  Reassessment complete. No change in pt condition    1720  Pt given d/c instructions and opportunity for questions/clarification. Pt verbalized understanding.   Pt d/c'd to home

## 2017-10-04 NOTE — PROGRESS NOTES
Problem: Mobility Impaired (Adult and Pediatric)  Goal: *Acute Goals and Plan of Care (Insert Text)  Physical Therapy Goals  Initiated 10/2/2017 and to be accomplished within 7 day(s)  1. Patient will move from supine to sit and sit to supine in bed with modified independence. 2. Patient will transfer from bed to chair and chair to bed with modified independence using the least restrictive device. 3. Patient will perform sit to stand with modified independence. 4. Patient will ambulate with modified independence for 10 feet with the least restrictive device. Outcome: Progressing Towards Goal  PHYSICAL THERAPY TREATMENT     Patient: Fernandez Roper (84 y.o. female)  Date: 10/4/2017  Diagnosis: Atrial fibrillation [facility]) Atrial fibrillation with RVR ([address]  Precautions: Fall   Chart, physical therapy assessment, plan of care and goals were reviewed. ASSESSMENT:  Home O2 walking test performed. Nasal cannula removed, s/p 5 minutes on RA SpO2 83%, stopped test.  Pt ambulated 30ft with personal rollator, rests forearms on  during ambulation, decreased chucky, no LOB or path deviations. S/p ambulation on 4L O2, SpO2 89%. Formal stats in separate progress note for care mgmt. Pt left sitting in bathroom to get washed up, provided with supplies and instructed to pull cord for assistance. Education: explained O2 walking test  Progression toward goals:  [ ]      Improving appropriately and progressing toward goals  [X]      Improving slowly and progressing toward goals  [ ]      Not making progress toward goals and plan of care will be adjusted       PLAN:  Patient continues to benefit from skilled intervention to address the above impairments. Continue treatment per established plan of care.   Discharge Recommendations:  Home Health  Further Equipment Recommendations for Discharge:  Portable O2 tank       SUBJECTIVE:   Patient stated What do I have to do for this test?      OBJECTIVE DATA SUMMARY:   Critical Behavior:  Neurologic State: Alert  Orientation Level: Oriented X4  Cognition: Follows commands  Safety/Judgement: Decreased awareness of need for safety, Fall prevention  Functional Mobility Training:  Transfers:  Sit to Stand: Supervision  Stand to Sit: Supervision  Balance:  Sitting: Intact  Sitting - Static: Good (unsupported)  Sitting - Dynamic: Good (unsupported)  Standing: Impaired; With support  Standing - Static: Fair  Standing - Dynamic : Fair  Ambulation/Gait Training:  Distance (ft): 30 Feet (ft)  Assistive Device: Walker, rollator (O2)  Ambulation - Level of Assistance: Stand-by asssistance  Gait Abnormalities: Decreased step clearance  Base of Support: Center of gravity altered; Widened  Speed/Tiana: Slow;Delayed  Pain:  Pre:0  Post:0  Pain Scale 1: Numeric (0 - 10)  Activity Tolerance:   Fair  Please refer to the flowsheet for vital signs taken during this treatment.   After treatment:   [ ] Patient left in no apparent distress sitting up in chair  [X] Patient left in no apparent distress in bathroom  [X] Call bell left within reach  [ ] Nursing notified  [ ] Caregiver present  [ ] Bed alarm activated      Elias Roger PTA   Time Calculation: 24 mins

## 2017-10-04 NOTE — MANAGEMENT PLAN
Discharge Planning    1030Went to room to discuss discharge planning and offer SNF rehab. Pt states she has plenty of support at home and will b discharging home when medically ready. Pt may need home o2.  Notified her for qualification criteria she must have ambulating walking test showing oxygen decreasing below 88%  1130: Verbal order, read back from Dr Jo Sargnet for home oxygen at 4 liters and home health    Nel Cheney RN BSN  Outcomes Manager  Pager # [phone]

## 2017-10-04 NOTE — PROGRESS NOTES
[facility]: This patient meets P & T approved criteria for conversion from IV to oral therapy for the following medication:     Levofloxacin 750 mg IV q24h was discontinued and Levofloxacin 750 mg PO q24h was ordered. If the patient no longer meets all criteria for oral therapy, the pharmacist will switch back to IV therapy. Thanks.

## 2017-10-04 NOTE — PROGRESS NOTES
Care Management Interventions  PCP Verified by CM: Yes  Palliative Care Criteria Met (RRAT>21 & CHF Dx)?: No  Mode of Transport at Discharge: Other (see comment) (boy friend)  Transition of Care Consult (CM Consult): [address]: Yes  MyChart Signup: No  Discharge Durable Medical Equipment: Yes ([address] [facility])  Physical Therapy Consult: Yes  Occupational Therapy Consult: Yes  Speech Therapy Consult: No  Current Support Network:  Other (lives with boy friend Nato)  Confirm Follow Up Transport: Friends  Plan discussed with Pt/Family/Caregiver: Yes  Freedom of Choice Offered: Yes  Discharge Location  Discharge Placement: Home with home health

## 2017-10-04 NOTE — DISCHARGE SUMMARY
Discharge Summary    Patient: Tom Bolanos               Sex: female          DOA: 10/1/2017         YOB: 1955      Age:  65 y.o.        LOS:  LOS: 3 days                Admit Date: 10/1/2017    Discharge Date: 10/4/2017    Admission Diagnoses: Atrial fibrillation [facility])    Discharge Diagnoses:    Problem List as of 10/4/2017  Never Reviewed          Codes Class Noted - Resolved    COPD exacerbation ([facility] ICD-10-CM: J44.1  ICD-9-CM: 491.21  10/1/2017 - Present        * (Principal)Atrial fibrillation with RVR ([facility] ICD-10-CM: I48.91  ICD-9-CM: 427.31  10/1/2017 - Present     Pneumonia cap                                                                                                                               10/1      Pleural effusion ICD-10-CM: J90  ICD-9-CM: 511.9  10/1/2017 - Present        Atrial fibrillation ([facility] ICD-10-CM: I48.91  ICD-9-CM: 427.31  10/1/2017 - Present      Morbid obesity                                                                                                                                  10/1    Acute hypoxic respiratory failure                                                                                                      10/1    Chronic knee pain/arthritis     Debility          Discharge Medications:     Current Discharge Medication List      START taking these medications    Details   albuterol-ipratropium (DUO-NEB) 2.5 mg-0.5 mg/3 ml nebu 3 mL by Nebulization route every four (4) hours as needed. Qty: 30 Nebule, Refills: 0      aspirin (ASPIRIN) 325 mg tablet Take 1 Tab by mouth daily. Qty: 100 Tab, Refills: 0      digoxin (LANOXIN) 0.25 mg tablet Take 1 Tab by mouth daily. Qty: 30 Tab, Refills: 0      metoprolol tartrate (LOPRESSOR) 25 mg tablet Take 0.5 Tabs by mouth every twelve (12) hours. Qty: 60 Tab, Refills: 0      levoFLOXacin (LEVAQUIN) 750 mg tablet Take 1 Tab by mouth daily.   Qty: 7 Tab, Refills: 0      dexamethasone (DECADRON) 2 mg tablet Take 4 mg po tid x 3 days,then 4 mg po bid x 3 days,then 4 mg po daily x 3 days  Qty: 18 Tab, Refills: 0         CONTINUE these medications which have CHANGED    Details   furosemide (LASIX) 40 mg tablet 40 MG PO DAILY  Qty: 30 Tab, Refills: 0         CONTINUE these medications which have NOT CHANGED    Details   HYDROcodone-acetaminophen (NORCO) 7.5-325 mg per tablet Take 1 Tab by mouth every four (4) hours. amitriptyline (ELAVIL) 25 mg tablet Take 50 mg by mouth nightly. diclofenac potassium (CATAFLAM) 50 mg tablet Take 50 mg by mouth two (2) times a day. Indications: RHEUMATOID ARTHRITIS      cholecalciferol (VITAMIN D3) 1,000 unit cap Take  by mouth daily. albuterol (PROVENTIL VENTOLIN) 2.5 mg /3 mL (0.083 %) nebulizer solution by Nebulization route once. Follow-up:pcp and pulmonary    Discharge Condition:good    Activity:as tolerated    Diet:heart healthy    Labs:  Labs: Results:       Chemistry Recent Labs      10/04/17   0558  10/03/17   0542  10/02/17   0405   GLU  184*  167*  145*   NA  134*  135*  137   K  4.3  4.7  4.2   CL  95*  94*  97*   CO2  37*  33*  34*   BUN  35*  30*  23*   CREA  0.77  0.91  0.87   CA  9.0  8.9  8.8   AGAP  2*  8  6   BUCR  45*  33*  26*      CBC w/Diff Recent Labs      10/04/17   0558  10/03/17   0542  10/02/17   0405   WBC  10.1  9.3  10.2   RBC  4.43  4.61  4.39   HGB  13.3  13.8  13.3   HCT  42.6  45.1*  42.7   PLT  287  281  239   GRANS  89*   --    --    LYMPH  6*   --    --    EOS  0   --    --       Cardiac Enzymes No results for input(s): CPK, CKND1, DOMINIK in the last 72 hours. No lab exists for component: CKRMB, TROIP   Coagulation No results for input(s): PTP, INR, APTT in the last 72 hours.     No lab exists for component: INREXT    Lipid Panel No results found for: CHOL, CHOLPOCT, CHOLX, CHLST, CHOLV, 160083, HDL, LDL, LDLC, DLDLP, 526733, VLDLC, VLDL, TGLX, TRIGL, TRIGP, TGLPOCT, CHHD, CHHDX   BNP No results for input(s): BNPP in the last 72 hours. Liver Enzymes No results for input(s): TP, ALB, TBIL, AP, SGOT, GPT in the last 72 hours. No lab exists for component: DBIL   Thyroid Studies Lab Results   Component Value Date/Time    TSH 0.62 10/01/2017 10:45 AM          Imaging:  CTA chest on 10/3:  1. No evidence of pulmonary embolism. 2. Pneumonic infiltrates involving the right lower lobe and medial left lower  lobe as well as atelectasis of the right middle lobe. Additional patchy  groundglass opacities throughout the lungs may represent additional areas of  infectious infiltrate or pneumonitis. Following a course of treatment, repeat  imaging suggested to ensure complete resolution    CXR on 10/1  No radiographic evidence of an acute abnormality. Elevation right hemidiaphragm  in right basilar atelectasis    Consults:   Pulmonary and cardiology    Treatment Team: Treatment Team: Attending Provider: Fabian Lesile MD; Scribe: Caden Lujan; Consulting Provider: Juan Jose Small MD; Utilization Review: Radha Naranjo RN; Physical Therapy Assistant: Jina Peña PTA; Occupational Therapy Assistant: DIMPLE Darling; Care Manager: Massiel Brar RN; Consulting Provider: Sanford Murillo MD    Significant Diagnostic Studies:  LEFT VENTRICLE: Size was normal. Systolic function was at the lower limits of   normal by visual assessment. Ejection  fraction was estimated to be 50 %. No obvious wall motion abnormalities   identified in the views obtained. Wall  thickness was increased. DOPPLER: The study was not technically sufficient to  [facility] evaluation of LV diastolic function. RIGHT VENTRICLE: The size was probably normal. Systolic function was reduced.   Not well visualized. LEFT ATRIUM: Size was normal.    YANIQUE Hester is a 65y.o. year old female who presents with SOB. States shortness of breat has been going on for days to weeks worsening over last 24 hrs.   No Chest pain, N/V, fever associated. She states there has been some swelling. She does not have any cardiac hx, she is obese. She does not have HTN and has diet controlled dm. When seen today on dilt drip for afib RVR with improving rate now in 110s. She is starting to feel better with rate control. Did not feel palpitations    Hospital Course:  1)  Afib RVR                        -Was on cardizem. Then put on metoprolol 12.5 mg po bid                        -As heart rate continued to be high,Cardiology consulted. Digoxin daily added on 10/3                        - Chads score 1.  On asa                        -ECHO with EF 50 %. No obvious wall motion abnormalities identified in the views obtained.                        -Heart rate now controlled.      2)  Copd exacerbation/Pneumonia                        -still requiring oxygen at 4 liters and desat with ambulation. Will discharge with oxygen                         -will continue levaquin for 7 days                        -on dexamethasone- states pred allergy is mood change, duoneb, levaquin.                        -on decadron tapering dose.                        -patient will need pulmonary work-up. I discussed with Kelsie Alexis who has arranged that patient follow up with [facility] in 6 weeks. Office number was given to patient.      3)  Chronic knee pain/arthritis                        - on norco prn      4)  Mild peripheral edema                        - lasix 40 mg oral daily    5) Morbid obesity                         -Educated weight loss    6)  Respiratory failure,hypoxic:                         -Was on bipap. Then nasal cannula oxygen 4 liters.                         -Will benefit from outpatient sleep study    7)  Debility:                         -Home health services for PT,oxygen monitoring.       Time for discharge:45 minutes.         Abram Cooks, MD  October 4, 2017

## 2017-10-04 NOTE — PROGRESS NOTES
Problem: Self Care Deficits Care Plan (Adult)  Goal: *Acute Goals and Plan of Care (Insert Text)  Occupational Therapy Goals  Initiated 10/2/2017 within 7 day(s). 1. Patient will perform grooming tasks at EOB with supervision for balance. 2. Patient will perform lower body dressing with supervision and AE, prn.  3. Patient will perform functional task in standing with support for 8 minutes with <2 rest breaks o increase activity tolerance for ADLs. 4. Patient will perform toilet transfers with supervision/set-up. 5. Patient will perform all aspects of toileting with supervision/set-up. 6. Patient will participate in upper extremity therapeutic exercise/activities with supervision/set-up for 8 minutes to increase BUE strength for ADLs. 7. Patient will utilize energy conservation techniques during functional activities with minimal verbal cues. Outcome: Progressing Towards Goal  OCCUPATIONAL THERAPY TREATMENT     Patient: Porsha Velez (65 y.o. female)  Date: 10/4/2017  Diagnosis: Atrial fibrillation [facility]) Atrial fibrillation with RVR [facility])       Precautions: Fall  Chart, occupational therapy assessment, plan of care, and goals were reviewed. ASSESSMENT:  Pt seated EOB upon entry, states she's going home. Pt educated on use of adaptive equipment w/LB ADLs and issued reacher and socks aid. Pt demonstrates poor safety awareness w/O2 tubing mgt and functional mobility w/rollator, requiring max vc's. Pt does not tolerate standing sinkside to perform hand hygiene s/p toileting ADL and requires set-up seated EOB.  Pt maintains O2 sats 97% on 4L O2.  EDUCATION Pt educated on functional mobility and O2 tubing mgt for increase safety  Progression toward goals:  [X]          Improving appropriately and progressing toward goals  [ ]          Improving slowly and progressing toward goals  [ ]          Not making progress toward goals and plan of care will be adjusted       PLAN:  Patient continues to benefit from skilled intervention to address the above impairments. Continue treatment per established plan of care. Discharge Recommendations:  Home Health  Further Equipment Recommendations for Discharge:  shower chair       SUBJECTIVE:   Patient stated I gotta go to the bathroom bad.       OBJECTIVE DATA SUMMARY:         Cognitive/Behavioral Status:  Neurologic State: Alert  Orientation Level: Oriented X4  Cognition: Poor safety awareness  Safety/Judgement: Decreased awareness of need for safety, Fall prevention  Functional Mobility and Transfers for ADLs:              Transfers:  Sit to Stand: Supervision              Toilet Transfer : Modified independent              Bathroom Mobility: Stand-by assistance              Balance:  Sitting: Intact  Sitting - Static: Good (unsupported)  Sitting - Dynamic: Good (unsupported)  Standing: Impaired; With support  Standing - Static: Good  Standing - Dynamic : Fair  ADL Intervention:  Grooming  Washing Hands: Supervision/set-up (seated EOB)     Lower Body Dressing Assistance  Underpants: Stand-by assistance (simulated w/pillowcase)  Socks: Modified independent  Leg Crossed Method Used: No  Position Performed: Seated edge of bed  Cues: Don;Doff  Adaptive Equipment Used: Sock aid;Reacher     Toileting  Toileting Assistance: Modified independent     Pain:  Pre Treatment:0  Post Treatment:0     Activity Tolerance:    Good     Please refer to the flowsheet for vital signs taken during this treatment.   After treatment:   [ ]  Patient left in no apparent distress sitting up in chair  [X]  Patient left in no apparent distress seated EOB  [X]  Call bell left within reach  [X]  Nursing notified  [ ]  Caregiver present  [ ]  Bed alarm activated     DIMPLE Mahoney  Time Calculation: 30 mins

## 2017-10-04 NOTE — PROGRESS NOTES
Offered the pt FOC for home care, she chose [facility]. Referral sent to intake via Connect are and called to the [facility] for f/u. Ordered the pt's home 02 from [facility] # [phone] via [address]. Called the referral to [facility] rep, Liban Martinez. Informed her the pt is dc'd home this afternoon. She reported the portables will be delivered to the pt's room today by 1700. Pt notified. Included in the CC note and the LVMM the pt will dc to: [address] [place] [address] # [address].   # [phone]. [phone]  Pt verified [facility] has called her with 02 delivery time for today by 1700

## 2017-10-04 NOTE — DISCHARGE INSTRUCTIONS
Atrial Fibrillation: Care Instructions  Your Care Instructions    Atrial fibrillation is an irregular and often fast heartbeat. Treating this condition is important for several reasons. It can cause blood clots, which can travel from your heart to your brain and cause a stroke. If you have a fast heartbeat, you may feel lightheaded, dizzy, and weak. An irregular heartbeat can also increase your risk for heart failure. Atrial fibrillation is often the result of another heart condition, such as high blood pressure or coronary artery disease. Making changes to improve your heart condition will help you stay healthy and active. Follow-up care is a key part of your treatment and safety. Be sure to make and go to all appointments, and call your doctor if you are having problems. It's also a good idea to know your test results and keep a list of the medicines you take. How can you care for yourself at home? Medicines  · Take your medicines exactly as prescribed. Call your doctor if you think you are having a problem with your medicine. You will get more details on the specific medicines your doctor prescribes. · If your doctor has given you a blood thinner to prevent a stroke, be sure you get instructions about how to take your medicine safely. Blood thinners can cause serious bleeding problems. · Do not take any vitamins, over-the-counter drugs, or herbal products without talking to your doctor first.  Lifestyle changes  · Do not smoke. Smoking can increase your chance of a stroke and heart attack. If you need help quitting, talk to your doctor about stop-smoking programs and medicines. These can increase your chances of quitting for good. · Eat a heart-healthy diet. · Stay at a healthy weight. Lose weight if you need to. · Limit alcohol to 2 drinks a day for men and 1 drink a day for women. Too much alcohol can cause health problems. · Avoid colds and flu. Get a pneumococcal vaccine shot.  If you have had one before, ask your doctor whether you need another dose. Get a flu shot every year. If you must be around people with colds or flu, wash your hands often. Activity  · If your doctor recommends it, get more exercise. Walking is a good choice. Bit by bit, increase the amount you walk every day. Try for at least 30 minutes on most days of the week. You also may want to swim, bike, or do other activities. Your doctor may suggest that you join a cardiac rehabilitation program so that you can have help increasing your physical activity safely. · Start light exercise if your doctor says it is okay. Even a small amount will help you get stronger, have more energy, and manage stress. Walking is an easy way to get exercise. Start out by walking a little more than you did in the hospital. Gradually increase the amount you walk. · When you exercise, watch for signs that your heart is working too hard. You are pushing too hard if you cannot talk while you are exercising. If you become short of breath or dizzy or have chest pain, sit down and rest immediately. · Check your pulse regularly. Place two fingers on the artery at the palm side of your wrist, in line with your thumb. If your heartbeat seems uneven or fast, talk to your doctor. When should you call for help? Call 911 anytime you think you may need emergency care. For example, call if:  · You have symptoms of a heart attack. These may include:  ¨ Chest pain or pressure, or a strange feeling in the chest.  ¨ Sweating. ¨ Shortness of breath. ¨ Nausea or vomiting. ¨ Pain, pressure, or a strange feeling in the back, neck, jaw, or upper belly or in one or both shoulders or arms. ¨ Lightheadedness or sudden weakness. ¨ A fast or irregular heartbeat. After you call 911, the  may tell you to chew 1 adult-strength or 2 to 4 low-dose aspirin. Wait for an ambulance. Do not try to drive yourself. · You have symptoms of a stroke.  These may include:  ¨ Sudden numbness, tingling, weakness, or loss of movement in your face, arm, or leg, especially on only one side of your body. ¨ Sudden vision changes. ¨ Sudden trouble speaking. ¨ Sudden confusion or trouble understanding simple statements. ¨ Sudden problems with walking or balance. ¨ A sudden, severe headache that is different from past headaches. · You passed out (lost consciousness). Call your doctor now or seek immediate medical care if:  · You have new or increased shortness of breath. · You feel dizzy or lightheaded, or you feel like you may faint. · Your heart rate becomes irregular. · You can feel your heart flutter in your chest or skip heartbeats. Tell your doctor if these symptoms are new or worse. Watch closely for changes in your health, and be sure to contact your doctor if you have any problems. Where can you learn more? Go to http://drissTipHivenabila.info/. Enter U020 in the search box to learn more about \"Atrial Fibrillation: Care Instructions. \"  Current as of: September 21, 2016  Content Version: 11.3  © 3441-4885 [employer]. Care instructions adapted under license by [employer] (which disclaims liability or warranty for this information). If you have questions about a medical condition or this instruction, always ask your healthcare professional. [address] any warranty or liability for your use of this information. Deciding Between Electrical Cardioversion and Rate Control Medicines for Atrial Fibrillation  What is atrial fibrillation? Atrial fibrillation (say \"AY-tree-sumi kaa-lcwb-RUQ-shun\") is a kind of uneven heartbeat. It can make you feel lightheaded and dizzy. You may feel weak. It also can make you more likely to have a stroke. Electrical cardioversion can return your heart to a normal rhythm. First you'll get medicines to make you sleepy and control pain.  Then your doctor will use patches to send an electric current to your heart. This resets the rhythm of your heart. Not everyone with atrial fibrillation needs this treatment. For some people, taking medicines may be better. Most people can live with an uneven heartbeat. It just has to be kept under control so the heart does not beat too fast.  Use this information to help you and your doctor decide which treatment to choose for atrial fibrillation. What are alvarenga points about this decision? · Electrical cardioversion can return your heart to a normal rhythm. But the problem can come back. The longer you have had atrial fibrillation, the more likely it is to come back after this treatment. · Cardioversion may not work as well when an uneven heartbeat is caused by another heart disease, such as heart failure. · If your symptoms bother you a lot, you may want to try cardioversion. But even if it works, you may still need to take blood thinners to prevent a stroke. · If you don't have symptoms, or if they don't bother you much, you can try medicines to slow your heart rate. And you can take blood thinners to prevent a stroke. · Cardioversion does have risks, such as stroke. Discuss the risks with your doctor. Make sure you understand them. · You may have more than one heart problem. Cardioversion doesn't work as well if you have more than one heart problem. Why might you choose electrical cardioversion? · It restores the normal heart rhythm for most people. · The idea of having an electric shock does not bother you. · Your symptoms bother you a lot. · You have had atrial fibrillation just one time. · You do not have other heart problems. · You may not have to take as many medicines. Or you may not need to take them as long. Why might you choose rate-control medicines? · These medicines keep many people from having symptoms. · You prefer to take medicines rather than have an electric shock. · Your symptoms don't bother you much.   · If these medicines don't work, you can still try electrical cardioversion. Your decision  Thinking about the facts and your feelings can help you make a decision that is right for you. Be sure you understand the benefits and risks of your options. And think about what else you need to do before you make the decision. Where can you learn more? Go to http://driss-nabila.info/. Enter T677 in the search box to learn more about \"Deciding Between Electrical Cardioversion and Rate Control Medicines for Atrial Fibrillation. \"  Current as of: April 3, 2017  Content Version: 11.3  © 3875-8561 [employer]. Care instructions adapted under license by [employer] (which disclaims liability or warranty for this information). If you have questions about a medical condition or this instruction, always ask your healthcare professional. [address] any warranty or liability for your use of this information. Learning About Saving Energy When You Have a Chronic Condition  Introduction  Everyday tasks can be tiring when you have COPD, heart failure, or another long-term (chronic) condition. You may feel at times that you've lost your ability to live your life. But learning to conserve, or save, your energy can help you be less tired. Conserving your energy means finding ways of doing daily activities with as little effort as possible. With some small changes in the way you do things, you can get your tasks done more easily. Some treatments are available that might help. Pulmonary rehabilitation can teach you ways to breathe easier. Cardiac rehabilitation can help make your heart stronger. You also may want to see an occupational or physical therapist. The therapist can give you more tips on building strength and moving with less effort. What can you do to conserve your energy? Planning  · Make a list of what you have to do every day.  Group the tasks by location. · Do all the chores in one part of your house around the same time. · Go out for errands or do chores at the time of day when you have the most energy. · Plan rest periods into your day. Getting things done  · Sit down as often as you can when you get dressed, do chores, or cook. · Use a cart with wheels to roll items, such as laundry, from one room to another. · Push or slide boxes or other large items instead of lifting them. Reaching and bending  · Put things you use the most on shelves that are at the level of your waist or shoulder. · Use long-handled grabbers or other tools to reach items on a high shelf or to  things off the floor. Use long-handled dusters when you clean the house. · Use a raised toilet seat to avoid bending too far to sit or stand up. Eating  · Eat several small meals instead of three larger meals. · If you get too tired to eat much, try to choose healthy foods that have more calories. Have a yogurt-and-fruit smoothie for breakfast. Put avocado on a sandwich. Or add cheese or peanut butter to snacks. · If you don't feel very hungry, try to eat first and drink water or other fluids later, after a meal. This can help keep you from losing weight. Sip small amounts of fluids if you need to drink while you eat. Having sex  · Choose the time of day when you have more energy. · A kqrs-zq-dqll position for sex can be less tiring. Sometimes you may want to focus more on caressing. Watch closely for changes in your health, and be sure to contact your doctor if you have any problems. Where can you learn more? Go to http://driss-nabila.info/. Enter H190 in the search box to learn more about \"Learning About Saving Energy When You Have a Chronic Condition. \"  Current as of: March 25, 2017  Content Version: 11.3  © 9454-9189 [employer].  Care instructions adapted under license by [employer] (which disclaims liability or warranty for this information). If you have questions about a medical condition or this instruction, always ask your healthcare professional. [address] any warranty or liability for your use of this information. Chronic Obstructive Pulmonary Disease (COPD): Care Instructions  Your Care Instructions    Chronic obstructive pulmonary disease (COPD) is a general term for a group of lung diseases, including emphysema and chronic bronchitis. People with COPD have decreased airflow in and out of the lungs, which makes it hard to breathe. The airways also can get clogged with thick mucus. Cigarette smoking is a major cause of COPD. Although there is no cure for COPD, you can slow its progress. Following your treatment plan and taking care of yourself can help you feel better and live longer. Follow-up care is a key part of your treatment and safety. Be sure to make and go to all appointments, and call your doctor if you are having problems. It's also a good idea to know your test results and keep a list of the medicines you take. How can you care for yourself at home? Staying healthy  · Do not smoke. This is the most important step you can take to prevent more damage to your lungs. If you need help quitting, talk to your doctor about stop-smoking programs and medicines. These can increase your chances of quitting for good. · Avoid colds and flu. Get a pneumococcal vaccine shot. If you have had one before, ask your doctor whether you need a second dose. Get the flu vaccine every fall. If you must be around people with colds or the flu, wash your hands often. · Avoid secondhand smoke, air pollution, and high altitudes. Also avoid cold, dry air and hot, humid air. Stay at home with your windows closed when air pollution is bad. Medicines and oxygen therapy  · Take your medicines exactly as prescribed. Call your doctor if you think you are having a problem with your medicine.   · You may be taking medicines such as:  ¨ Bronchodilators. These help open your airways and make breathing easier. Bronchodilators are either short-acting (work for 6 to 9 hours) or long-acting (work for 24 hours). You inhale most bronchodilators, so they start to act quickly. Always carry your quick-relief inhaler with you in case you need it while you are away from home. ¨ Corticosteroids (prednisone, budesonide). These reduce airway inflammation. They come in pill or inhaled form. You must take these medicines every day for them to work well. · A spacer may help you get more inhaled medicine to your lungs. Ask your doctor or pharmacist if a spacer is right for you. If it is, ask how to use it properly. · Do not take any vitamins, over-the-counter medicine, or herbal products without talking to your doctor first.  · If your doctor prescribed antibiotics, take them as directed. Do not stop taking them just because you feel better. You need to take the full course of antibiotics. · Oxygen therapy boosts the amount of oxygen in your blood and helps you breathe easier. Use the flow rate your doctor has recommended, and do not change it without talking to your doctor first.  Activity  · Get regular exercise. Walking is an easy way to get exercise. Start out slowly, and walk a little more each day. · Pay attention to your breathing. You are exercising too hard if you cannot talk while you are exercising. · Take short rest breaks when doing household chores and other activities. · Learn breathing methods--such as breathing through pursed lips--to help you become less short of breath. · If your doctor has not set you up with a pulmonary rehabilitation program, talk to him or her about whether rehab is right for you. Rehab includes exercise programs, education about your disease and how to manage it, help with diet and other changes, and emotional support. Diet  · Eat regular, healthy meals.  Use bronchodilators about 1 hour before you eat to make it easier to eat. Eat several small meals instead of three large ones. Drink beverages at the end of the meal. Avoid foods that are hard to chew. · Eat foods that contain protein so that you do not lose muscle mass. · Talk with your doctor if you gain too much weight or if you lose weight without trying. Mental health  · Talk to your family, friends, or a therapist about your feelings. It is normal to feel frightened, angry, hopeless, helpless, and even guilty. Talking openly about bad feelings can help you cope. If these feelings last, talk to your doctor. When should you call for help? Call 911 anytime you think you may need emergency care. For example, call if:  · You have severe trouble breathing. Call your doctor now or seek immediate medical care if:  · You have new or worse trouble breathing. · You cough up blood. · You have a fever. Watch closely for changes in your health, and be sure to contact your doctor if:  · You cough more deeply or more often, especially if you notice more mucus or a change in the color of your mucus. · You have new or worse swelling in your legs or belly. · You are not getting better as expected. Where can you learn more? Go to http://driss-nabila.info/. Pooja Iyer in the search box to learn more about \"Chronic Obstructive Pulmonary Disease (COPD): Care Instructions. \"  Current as of: March 25, 2017  Content Version: 11.3  © 6014-3310 [employer]. Care instructions adapted under license by [employer] (which disclaims liability or warranty for this information). If you have questions about a medical condition or this instruction, always ask your healthcare professional. [place] [address] any warranty or liability for your use of this information.

## 2017-10-04 NOTE — PROGRESS NOTES
[facility]  -  Progress Note      Patient: Camden Both MRN: [id number]  SSN: [id number]    YOB: 1955  Age: 65 y.o. Sex: female      Admit Date: 10/1/2017      I saw, evaluated, interviewed and examined the patient personally. I agree with the findings and plan of care as documented below with PA-C note  PAF in setting of R sided pneumonia  Continue current meds  ASA for now  No further cardiac work up planned at this time unless clinical status changes. Call us back if needed. Will be available as needed. Will need to follow up in cardiology clinic in 2-3 weeks after discharge. Thank you. Clare Leonardo MD  [facility] Problem List:     Hospital Problems  Never Reviewed          Codes Class Noted POA    COPD exacerbation [facility]) ICD-10-CM: J44.1  ICD-9-CM: 491.21  10/1/2017 Unknown        * (Principal)Atrial fibrillation with RVR ([facility] [address] ICD-10-CM: I48.91  ICD-9-CM: 427.31  10/1/2017 Unknown        Atrial fibrillation ([facility] [address] ICD-10-CM: I48.91  ICD-9-CM: 427.31  10/1/2017 Unknown            -New onset atrial fibrillation, CHADS2 score 1 (+ Diet controlled diabetes per patient, ). Stroke risk about 3%. Off cardizem gtt. Converted to NSR between 4-8pm yesterday. Remains in NSR this morning with some PVCs. -Echo with normal LV function 10/2/17, +LVH, RV function decreased.  -Acute respiratory distress with elevated arthur diaphragm on CXR. Pulmonary on board and pt to have CTA chest to rule out mass/obstruction. 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   -DM, controlled with diet, not on any meds  -Morbid obesity    Assessment & Plan:     -Seen and evaluated after being alerted that 's. Pt is sitting up and states she is comfortable. 5L NC O2 in place. Pt had an albuterol tx around 8am this morning. She has not received her PO meds yet.  Asymptomatic increase in HR 2/2 to albuterol. No new recommendations.   -Continue BB and digoxin.   -Continue aspirin.   -Will sign off and be available if needed.   -Spoke with patient about follow up with our office. She reports she would like to follow up with Dr. Maryann Conteh. I have made an appointment for her: October 24th at 1pm.    Subjective:     Sitting up in bed. Comfortable. No distress. Denies shortness of breath, palps, or chest pain.     Objective:      Patient Vitals for the past 8 hrs:   Temp Pulse Resp BP SpO2   10/04/17 0755 - - - - 93 %   10/04/17 0614 98.1 °F (36.7 °C) 68 18 134/76 96 %   10/04/17 0220 98.1 °F (36.7 °C) 60 18 105/66 92 %   10/04/17 0208 - - - - 94 %         Patient Vitals for the past 96 hrs:   Weight   10/04/17 0614 136.8 kg (301 lb 8 oz)   10/01/17 1049 135.2 kg (298 lb)         Intake/Output Summary (Last 24 hours) at 10/04/17 0931  Last data filed at 10/03/17 1955   Gross per 24 hour   Intake           930.92 ml   Output              800 ml   Net           130.92 ml       Physical Exam:  General:  Awake, alert, oriented x 3, in no distress  Neck:  Supple, cannot assess for jvd given body habitus  Lungs:  Diminished R lung base, L base clear, apices clear, no wheezing or rales  Heart:  Tachy, regular rhythm, no murmur  Abdomen:  Obese, soft, non-tender  Extremities: no LE edema    Data Review:     Labs: Results:       Chemistry Recent Labs      10/04/17   0558  10/03/17   0542  10/02/17   0405  10/01/17   1045   GLU  184*  167*  145*  135*   NA  134*  135*  137  136   K  4.3  4.7  4.2  4.1   CL  95*  94*  97*  100   CO2  37*  33*  34*  30   BUN  35*  30*  23*  22*   CREA  0.77  0.91  0.87  0.94   CA  9.0  8.9  8.8  9.0   MG  2.3  2.2  2.2   --    PHOS  3.4  3.5  4.4   --    AGAP  2*  8  6  6   BUCR  45*  33*  26*  23*   AP   --    --    --   170*   TP   --    --    --   7.2   ALB   --    --    --   3.3*   GLOB   --    --    --   3.9   AGRAT   --    --    --   0.8      CBC w/Diff Recent Labs 10/04/17   0558  10/03/17   0542  10/02/17   0405  10/01/17   1045   WBC  10.1  9.3  10.2  11.7   RBC  4.43  4.61  4.39  4.49   HGB  13.3  13.8  13.3  13.6   HCT  42.6  45.1*  42.7  43.6   PLT  287  281  239  231   GRANS  89*   --    --   75*   LYMPH  6*   --    --   15*   EOS  0   --    --   1      Cardiac Enzymes No results found for: CPK, CK, CKMMB, CKMB, RCK3, CKMBT, CKNDX, CKND1, DOMINIK, TROPT, TROIQ, EVA, TROPT, TNIPOC, BNP, BNPP   Coagulation No results for input(s): PTP, INR, APTT in the last 72 hours.     No lab exists for component: INREXT    Lipid Panel No results found for: CHOL, CHOLPOCT, CHOLX, CHLST, CHOLV, 521885, HDL, LDL, LDLC, DLDLP, 816072, VLDLC, VLDL, TGLX, TRIGL, TRIGP, TGLPOCT, CHHD, CHHDX   BNP No results found for: BNP, BNPP, XBNPT   Liver Enzymes Recent Labs      10/01/17   1045   TP  7.2   ALB  3.3*   AP  170*   SGOT  14*      Digoxin    Thyroid Studies Lab Results   Component Value Date/Time    TSH 0.62 10/01/2017 10:45 AM            Signed By: WALE Ramos     October 4, 2017

## 2017-10-04 NOTE — CDMP QUERY
Please clarify if this patient is being treated/managed for:    =>Is Pneumonia per CT chest done 10/3 diagnosed as POA?    =>Other Explanation of clinical findings  =>Unable to Determine (no explanation of clinical findings)    The medical record reflects the following:    Risk: 64 yo female w/PMH COPD    Clinical Indicators: CXR on admission  No radiographic evidence of an acute abnormality. Elevation right hemidiaphragm in right basilar atelectasis  CTA   chest 10/3   Pneumonic infiltrates involving the right lower lobe and medial left lower lobe as well as atelectasis of the right middle lobe. Additional patchy groundglass opacities throughout the lungs may represent additional areas of infectious infiltrate or pneumonitis    Treatment: Levaquin, IV fluids    Please clarify and document your clinical opinion in the progress notes and discharge summary including the definitive and/or presumptive diagnosis, (suspected or probable), related to the above clinical findings. Please include clinical findings supporting your diagnosis. If you DECLINE this query or would like to communicate with [facility], please utilize the \"SCONTO DIGITALE message box\" at the TOP of the Progress Note on the right.       Thank you,  Radha Arnold RN [facility]  [phone]

## 2017-10-06 ENCOUNTER — HOME CARE VISIT (OUTPATIENT)
Dept: SCHEDULING | Facility: HOME HEALTH | Age: 62
End: 2017-10-06
Payer: MEDICARE

## 2017-10-06 ENCOUNTER — HOME CARE VISIT (OUTPATIENT)
Dept: HOME HEALTH SERVICES | Facility: HOME HEALTH | Age: 62
End: 2017-10-06

## 2017-10-06 PROCEDURE — 3331090002 HH PPS REVENUE DEBIT

## 2017-10-06 PROCEDURE — 400013 HH SOC

## 2017-10-06 PROCEDURE — G0299 HHS/HOSPICE OF RN EA 15 MIN: HCPCS

## 2017-10-06 PROCEDURE — 3331090001 HH PPS REVENUE CREDIT

## 2017-10-07 LAB
BACTERIA SPEC CULT: NORMAL
BACTERIA SPEC CULT: NORMAL
SERVICE CMNT-IMP: NORMAL
SERVICE CMNT-IMP: NORMAL

## 2017-10-07 PROCEDURE — 3331090001 HH PPS REVENUE CREDIT

## 2017-10-07 PROCEDURE — 3331090002 HH PPS REVENUE DEBIT

## 2017-10-08 ENCOUNTER — HOME CARE VISIT (OUTPATIENT)
Dept: HOME HEALTH SERVICES | Facility: HOME HEALTH | Age: 62
End: 2017-10-08
Payer: MEDICARE

## 2017-10-08 PROCEDURE — 3331090001 HH PPS REVENUE CREDIT

## 2017-10-08 PROCEDURE — 3331090002 HH PPS REVENUE DEBIT

## 2017-10-09 ENCOUNTER — HOME CARE VISIT (OUTPATIENT)
Dept: HOME HEALTH SERVICES | Facility: HOME HEALTH | Age: 62
End: 2017-10-09
Payer: MEDICARE

## 2017-10-09 VITALS
HEART RATE: 78 BPM | OXYGEN SATURATION: 97 % | RESPIRATION RATE: 20 BRPM | SYSTOLIC BLOOD PRESSURE: 124 MMHG | TEMPERATURE: 98.1 F | DIASTOLIC BLOOD PRESSURE: 60 MMHG

## 2017-10-09 PROCEDURE — 3331090001 HH PPS REVENUE CREDIT

## 2017-10-09 PROCEDURE — 3331090002 HH PPS REVENUE DEBIT

## 2017-10-10 ENCOUNTER — HOME CARE VISIT (OUTPATIENT)
Dept: HOME HEALTH SERVICES | Facility: HOME HEALTH | Age: 62
End: 2017-10-10
Payer: MEDICARE

## 2017-10-10 PROCEDURE — 3331090001 HH PPS REVENUE CREDIT

## 2017-10-10 PROCEDURE — 3331090002 HH PPS REVENUE DEBIT

## 2017-10-11 ENCOUNTER — HOME CARE VISIT (OUTPATIENT)
Dept: SCHEDULING | Facility: HOME HEALTH | Age: 62
End: 2017-10-11
Payer: MEDICARE

## 2017-10-11 PROCEDURE — 3331090002 HH PPS REVENUE DEBIT

## 2017-10-11 PROCEDURE — 3331090001 HH PPS REVENUE CREDIT

## 2017-10-12 ENCOUNTER — HOME CARE VISIT (OUTPATIENT)
Dept: SCHEDULING | Facility: HOME HEALTH | Age: 62
End: 2017-10-12
Payer: MEDICARE

## 2017-10-12 ENCOUNTER — HOME CARE VISIT (OUTPATIENT)
Dept: HOME HEALTH SERVICES | Facility: HOME HEALTH | Age: 62
End: 2017-10-12
Payer: MEDICARE

## 2017-10-12 VITALS
TEMPERATURE: 97.2 F | HEART RATE: 92 BPM | OXYGEN SATURATION: 97 % | SYSTOLIC BLOOD PRESSURE: 128 MMHG | DIASTOLIC BLOOD PRESSURE: 80 MMHG | RESPIRATION RATE: 20 BRPM

## 2017-10-12 VITALS
DIASTOLIC BLOOD PRESSURE: 66 MMHG | WEIGHT: 293 LBS | HEART RATE: 99 BPM | OXYGEN SATURATION: 94 % | SYSTOLIC BLOOD PRESSURE: 130 MMHG | BODY MASS INDEX: 50.29 KG/M2

## 2017-10-12 PROCEDURE — G0496 LPN CARE TRAIN/EDU IN HH: HCPCS

## 2017-10-12 PROCEDURE — 3331090001 HH PPS REVENUE CREDIT

## 2017-10-12 PROCEDURE — G0495 RN CARE TRAIN/EDU IN HH: HCPCS

## 2017-10-12 PROCEDURE — 3331090002 HH PPS REVENUE DEBIT

## 2017-10-13 ENCOUNTER — HOME CARE VISIT (OUTPATIENT)
Dept: HOME HEALTH SERVICES | Facility: HOME HEALTH | Age: 62
End: 2017-10-13
Payer: MEDICARE

## 2017-10-13 PROCEDURE — 3331090002 HH PPS REVENUE DEBIT

## 2017-10-13 PROCEDURE — 3331090001 HH PPS REVENUE CREDIT

## 2017-10-14 PROCEDURE — 3331090001 HH PPS REVENUE CREDIT

## 2017-10-14 PROCEDURE — 3331090002 HH PPS REVENUE DEBIT

## 2017-10-15 ENCOUNTER — HOME CARE VISIT (OUTPATIENT)
Dept: HOME HEALTH SERVICES | Facility: HOME HEALTH | Age: 62
End: 2017-10-15
Payer: MEDICARE

## 2017-10-15 PROCEDURE — 3331090002 HH PPS REVENUE DEBIT

## 2017-10-15 PROCEDURE — 3331090001 HH PPS REVENUE CREDIT

## 2017-10-16 PROCEDURE — 3331090001 HH PPS REVENUE CREDIT

## 2017-10-16 PROCEDURE — 3331090002 HH PPS REVENUE DEBIT

## 2017-10-17 ENCOUNTER — HOME CARE VISIT (OUTPATIENT)
Dept: HOME HEALTH SERVICES | Facility: HOME HEALTH | Age: 62
End: 2017-10-17
Payer: MEDICARE

## 2017-10-17 PROCEDURE — 3331090002 HH PPS REVENUE DEBIT

## 2017-10-17 PROCEDURE — 3331090001 HH PPS REVENUE CREDIT

## 2017-10-18 PROCEDURE — 3331090002 HH PPS REVENUE DEBIT

## 2017-10-18 PROCEDURE — 3331090001 HH PPS REVENUE CREDIT

## 2017-10-19 PROCEDURE — 3331090001 HH PPS REVENUE CREDIT

## 2017-10-19 PROCEDURE — 3331090002 HH PPS REVENUE DEBIT

## 2017-10-20 ENCOUNTER — HOME CARE VISIT (OUTPATIENT)
Dept: SCHEDULING | Facility: HOME HEALTH | Age: 62
End: 2017-10-20
Payer: MEDICARE

## 2017-10-20 PROCEDURE — 3331090001 HH PPS REVENUE CREDIT

## 2017-10-20 PROCEDURE — G0299 HHS/HOSPICE OF RN EA 15 MIN: HCPCS

## 2017-10-20 PROCEDURE — 3331090002 HH PPS REVENUE DEBIT

## 2017-10-21 PROCEDURE — 3331090002 HH PPS REVENUE DEBIT

## 2017-10-21 PROCEDURE — 3331090001 HH PPS REVENUE CREDIT

## 2017-10-22 PROCEDURE — 3331090002 HH PPS REVENUE DEBIT

## 2017-10-22 PROCEDURE — 3331090001 HH PPS REVENUE CREDIT

## 2017-10-23 PROCEDURE — 3331090001 HH PPS REVENUE CREDIT

## 2017-10-23 PROCEDURE — 3331090002 HH PPS REVENUE DEBIT

## 2017-10-24 ENCOUNTER — TELEPHONE (OUTPATIENT)
Dept: CARDIOLOGY CLINIC | Age: 62
End: 2017-10-24

## 2017-10-24 PROCEDURE — 3331090002 HH PPS REVENUE DEBIT

## 2017-10-24 PROCEDURE — 3331090001 HH PPS REVENUE CREDIT

## 2017-10-24 NOTE — TELEPHONE ENCOUNTER
Pt stated she has an appt with a different Bonilla (cardiology) in [facility] per her PCP referral and does not need an appt with us at this time.

## 2017-10-24 NOTE — ANCILLARY DISCHARGE INSTRUCTIONS
[facility]  Discharge Phone Call       After-Care Discharge Phone Call Questions:    Were you able to get your prescriptions filled? Comment:      [x] Yes  []No    Comment if answer is \"No\"   Are you taking your medication(s) as your doctor ordered? Do you understand the purpose of your medications? Comment:    [x] Yes  []No    Comment if answer is \"No\"   Are you taking any other medications that are not on the list?  Comment:      [x] Yes  []No    Comment if answer is \"Yes\"   Do you have any questions about your medications? No  Are you aware of potential side effects? Yes   Comment:    [x] Yes  [x]No    Comment if answer is \"Yes\"   Did you make your follow-up appointments (if the hospital did not do this before  discharge)? Comment:    [x] Yes  []No    Comment if answer is \"No\"   Is there any reason you might not be able to keep your follow-up appointments? Comment:     [] Yes  [x]No    Comment if answer is \"Yes\"   Do you have any questions about your care plan? No  Are you aware of what health problems to be alert for? Yes   Comment:    [x] Yes  []No    Comment if answer is \"Yes\"   Do you have a good understanding of how you should manage your health? Comment:    [x] Yes  []No    Comment if answer is \"Yes\"   Do you know which symptoms to watch for that would mean you would need to call your doctor right away? Comment:      [x] Yes  []No    Comment if answer is \"No\"   Do you have any questions about the follow up process or any instructions that we have provided? Comment:    [] Yes  [x]No    Comment if answer is \"Yes\"   Did staff take your preferences into account?         [x] Yes  []No    Comment if answer is \"Yes\"

## 2017-10-25 PROCEDURE — 3331090002 HH PPS REVENUE DEBIT

## 2017-10-25 PROCEDURE — 3331090001 HH PPS REVENUE CREDIT

## 2017-10-26 PROCEDURE — 3331090002 HH PPS REVENUE DEBIT

## 2017-10-26 PROCEDURE — 3331090001 HH PPS REVENUE CREDIT

## 2017-10-27 ENCOUNTER — HOME CARE VISIT (OUTPATIENT)
Dept: HOME HEALTH SERVICES | Facility: HOME HEALTH | Age: 62
End: 2017-10-27
Payer: MEDICARE

## 2017-10-27 PROCEDURE — 3331090001 HH PPS REVENUE CREDIT

## 2017-10-27 PROCEDURE — 3331090002 HH PPS REVENUE DEBIT

## 2017-10-28 VITALS
SYSTOLIC BLOOD PRESSURE: 124 MMHG | DIASTOLIC BLOOD PRESSURE: 74 MMHG | OXYGEN SATURATION: 98 % | HEART RATE: 98 BPM | RESPIRATION RATE: 18 BRPM | TEMPERATURE: 98.7 F

## 2017-10-28 PROCEDURE — 3331090001 HH PPS REVENUE CREDIT

## 2017-10-28 PROCEDURE — 3331090002 HH PPS REVENUE DEBIT

## 2017-10-29 PROCEDURE — 3331090002 HH PPS REVENUE DEBIT

## 2017-10-29 PROCEDURE — 3331090001 HH PPS REVENUE CREDIT

## 2017-10-30 PROCEDURE — 3331090002 HH PPS REVENUE DEBIT

## 2017-10-30 PROCEDURE — 3331090001 HH PPS REVENUE CREDIT

## 2017-10-31 ENCOUNTER — HOME CARE VISIT (OUTPATIENT)
Dept: SCHEDULING | Facility: HOME HEALTH | Age: 62
End: 2017-10-31
Payer: MEDICARE

## 2017-10-31 PROCEDURE — 3331090002 HH PPS REVENUE DEBIT

## 2017-10-31 PROCEDURE — G0299 HHS/HOSPICE OF RN EA 15 MIN: HCPCS

## 2017-10-31 PROCEDURE — 3331090001 HH PPS REVENUE CREDIT

## 2017-11-01 PROCEDURE — 3331090001 HH PPS REVENUE CREDIT

## 2017-11-01 PROCEDURE — 3331090002 HH PPS REVENUE DEBIT

## 2017-11-02 PROCEDURE — 3331090001 HH PPS REVENUE CREDIT

## 2017-11-02 PROCEDURE — 3331090002 HH PPS REVENUE DEBIT

## 2017-11-03 ENCOUNTER — HOME CARE VISIT (OUTPATIENT)
Dept: SCHEDULING | Facility: HOME HEALTH | Age: 62
End: 2017-11-03
Payer: MEDICARE

## 2017-11-03 PROCEDURE — 3331090002 HH PPS REVENUE DEBIT

## 2017-11-03 PROCEDURE — G0299 HHS/HOSPICE OF RN EA 15 MIN: HCPCS

## 2017-11-03 PROCEDURE — 3331090001 HH PPS REVENUE CREDIT

## 2017-11-04 VITALS
DIASTOLIC BLOOD PRESSURE: 64 MMHG | RESPIRATION RATE: 20 BRPM | SYSTOLIC BLOOD PRESSURE: 110 MMHG | HEART RATE: 88 BPM | OXYGEN SATURATION: 96 % | TEMPERATURE: 98.9 F

## 2017-11-04 PROCEDURE — 3331090002 HH PPS REVENUE DEBIT

## 2017-11-04 PROCEDURE — 3331090001 HH PPS REVENUE CREDIT

## 2017-11-05 PROCEDURE — 3331090002 HH PPS REVENUE DEBIT

## 2017-11-05 PROCEDURE — 3331090001 HH PPS REVENUE CREDIT

## 2017-11-06 PROCEDURE — 3331090002 HH PPS REVENUE DEBIT

## 2017-11-06 PROCEDURE — 3331090001 HH PPS REVENUE CREDIT

## 2017-11-07 PROCEDURE — 3331090002 HH PPS REVENUE DEBIT

## 2017-11-07 PROCEDURE — 3331090001 HH PPS REVENUE CREDIT

## 2017-11-08 PROCEDURE — 3331090002 HH PPS REVENUE DEBIT

## 2017-11-08 PROCEDURE — 3331090001 HH PPS REVENUE CREDIT

## 2017-11-09 PROCEDURE — 3331090002 HH PPS REVENUE DEBIT

## 2017-11-09 PROCEDURE — 3331090001 HH PPS REVENUE CREDIT

## 2017-11-10 PROCEDURE — 3331090001 HH PPS REVENUE CREDIT

## 2017-11-10 PROCEDURE — 3331090002 HH PPS REVENUE DEBIT

## 2017-11-11 PROCEDURE — 3331090001 HH PPS REVENUE CREDIT

## 2017-11-11 PROCEDURE — 3331090002 HH PPS REVENUE DEBIT

## 2017-11-12 PROCEDURE — 3331090002 HH PPS REVENUE DEBIT

## 2017-11-12 PROCEDURE — 3331090001 HH PPS REVENUE CREDIT

## 2017-11-13 PROCEDURE — 3331090002 HH PPS REVENUE DEBIT

## 2017-11-13 PROCEDURE — 3331090001 HH PPS REVENUE CREDIT

## 2017-11-14 VITALS
SYSTOLIC BLOOD PRESSURE: 120 MMHG | RESPIRATION RATE: 20 BRPM | TEMPERATURE: 98.5 F | OXYGEN SATURATION: 98 % | HEART RATE: 112 BPM | DIASTOLIC BLOOD PRESSURE: 64 MMHG

## 2017-11-14 PROCEDURE — 3331090001 HH PPS REVENUE CREDIT

## 2017-11-14 PROCEDURE — 3331090002 HH PPS REVENUE DEBIT

## 2017-11-15 PROCEDURE — 3331090001 HH PPS REVENUE CREDIT

## 2017-11-15 PROCEDURE — 3331090002 HH PPS REVENUE DEBIT

## 2017-11-16 PROCEDURE — 3331090001 HH PPS REVENUE CREDIT

## 2017-11-16 PROCEDURE — 3331090002 HH PPS REVENUE DEBIT

## 2017-11-17 ENCOUNTER — HOME CARE VISIT (OUTPATIENT)
Dept: HOME HEALTH SERVICES | Facility: HOME HEALTH | Age: 62
End: 2017-11-17
Payer: MEDICARE

## 2017-11-17 PROCEDURE — 3331090002 HH PPS REVENUE DEBIT

## 2017-11-17 PROCEDURE — 3331090001 HH PPS REVENUE CREDIT

## 2017-11-18 ENCOUNTER — HOME CARE VISIT (OUTPATIENT)
Dept: SCHEDULING | Facility: HOME HEALTH | Age: 62
End: 2017-11-18

## 2017-11-18 PROCEDURE — G0299 HHS/HOSPICE OF RN EA 15 MIN: HCPCS

## 2017-11-18 PROCEDURE — 3331090001 HH PPS REVENUE CREDIT

## 2017-11-18 PROCEDURE — 3331090002 HH PPS REVENUE DEBIT

## 2017-11-19 ENCOUNTER — HOME CARE VISIT (OUTPATIENT)
Dept: HOME HEALTH SERVICES | Facility: HOME HEALTH | Age: 62
End: 2017-11-19
Payer: MEDICARE

## 2017-11-19 PROCEDURE — 3331090001 HH PPS REVENUE CREDIT

## 2017-11-19 PROCEDURE — 3331090002 HH PPS REVENUE DEBIT

## 2017-11-20 PROCEDURE — 3331090001 HH PPS REVENUE CREDIT

## 2017-11-20 PROCEDURE — 3331090002 HH PPS REVENUE DEBIT

## 2017-11-21 PROCEDURE — 3331090001 HH PPS REVENUE CREDIT

## 2017-11-21 PROCEDURE — 3331090002 HH PPS REVENUE DEBIT

## 2017-11-22 ENCOUNTER — HOME CARE VISIT (OUTPATIENT)
Dept: HOME HEALTH SERVICES | Facility: HOME HEALTH | Age: 62
End: 2017-11-22
Payer: MEDICARE

## 2017-11-22 PROCEDURE — 3331090002 HH PPS REVENUE DEBIT

## 2017-11-22 PROCEDURE — 3331090001 HH PPS REVENUE CREDIT

## 2017-11-23 PROCEDURE — 3331090001 HH PPS REVENUE CREDIT

## 2017-11-23 PROCEDURE — 3331090002 HH PPS REVENUE DEBIT

## 2017-11-24 PROCEDURE — 3331090001 HH PPS REVENUE CREDIT

## 2017-11-24 PROCEDURE — 3331090002 HH PPS REVENUE DEBIT

## 2017-11-25 PROCEDURE — 3331090001 HH PPS REVENUE CREDIT

## 2017-11-25 PROCEDURE — 3331090002 HH PPS REVENUE DEBIT

## 2017-11-26 PROCEDURE — 3331090002 HH PPS REVENUE DEBIT

## 2017-11-26 PROCEDURE — 3331090001 HH PPS REVENUE CREDIT

## 2017-11-27 PROCEDURE — 3331090001 HH PPS REVENUE CREDIT

## 2017-11-27 PROCEDURE — 3331090002 HH PPS REVENUE DEBIT

## 2017-11-28 PROCEDURE — 3331090002 HH PPS REVENUE DEBIT

## 2017-11-28 PROCEDURE — 3331090001 HH PPS REVENUE CREDIT

## 2017-11-29 VITALS
SYSTOLIC BLOOD PRESSURE: 120 MMHG | RESPIRATION RATE: 18 BRPM | DIASTOLIC BLOOD PRESSURE: 80 MMHG | HEART RATE: 104 BPM | OXYGEN SATURATION: 95 % | TEMPERATURE: 98.4 F

## 2017-11-29 PROCEDURE — 3331090002 HH PPS REVENUE DEBIT

## 2017-11-29 PROCEDURE — 3331090001 HH PPS REVENUE CREDIT

## 2017-11-30 PROCEDURE — 3331090001 HH PPS REVENUE CREDIT

## 2017-11-30 PROCEDURE — 3331090002 HH PPS REVENUE DEBIT

## 2017-12-01 PROCEDURE — 3331090001 HH PPS REVENUE CREDIT

## 2017-12-01 PROCEDURE — 3331090002 HH PPS REVENUE DEBIT

## 2017-12-02 PROCEDURE — 3331090002 HH PPS REVENUE DEBIT

## 2017-12-02 PROCEDURE — 3331090001 HH PPS REVENUE CREDIT

## 2017-12-03 PROCEDURE — 3331090002 HH PPS REVENUE DEBIT

## 2017-12-03 PROCEDURE — 3331090001 HH PPS REVENUE CREDIT

## 2017-12-04 PROCEDURE — 3331090002 HH PPS REVENUE DEBIT

## 2017-12-04 PROCEDURE — 3331090001 HH PPS REVENUE CREDIT

## 2018-01-28 PROBLEM — J96.01 ACUTE RESPIRATORY FAILURE WITH HYPOXIA AND HYPERCARBIA (HCC): Status: ACTIVE | Noted: 2018-01-28

## 2018-01-28 PROBLEM — J96.02 ACUTE RESPIRATORY FAILURE WITH HYPOXIA AND HYPERCARBIA (HCC): Status: ACTIVE | Noted: 2018-01-28

## 2019-01-28 ENCOUNTER — APPOINTMENT (OUTPATIENT)
Dept: PHYSICAL THERAPY | Age: 64
End: 2019-01-28

## 2021-08-03 PROBLEM — I48.91 ATRIAL FIBRILLATION (HCC): Status: RESOLVED | Noted: 2017-10-01 | Resolved: 2021-08-03
